# Patient Record
Sex: FEMALE | Race: WHITE | NOT HISPANIC OR LATINO | Employment: STUDENT | URBAN - METROPOLITAN AREA
[De-identification: names, ages, dates, MRNs, and addresses within clinical notes are randomized per-mention and may not be internally consistent; named-entity substitution may affect disease eponyms.]

---
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
## 2024-03-12 ENCOUNTER — OFFICE VISIT (OUTPATIENT)
Dept: OBGYN CLINIC | Age: 19
End: 2024-03-12
Payer: COMMERCIAL

## 2024-03-12 VITALS
WEIGHT: 134 LBS | SYSTOLIC BLOOD PRESSURE: 110 MMHG | BODY MASS INDEX: 22.88 KG/M2 | DIASTOLIC BLOOD PRESSURE: 68 MMHG | HEIGHT: 64 IN

## 2024-03-12 DIAGNOSIS — R10.2 PELVIC PAIN: Primary | ICD-10-CM

## 2024-03-12 DIAGNOSIS — N80.9 ENDOMETRIOSIS DETERMINED BY LAPAROSCOPY: ICD-10-CM

## 2024-03-12 DIAGNOSIS — N93.9 ABNORMAL UTERINE BLEEDING (AUB): ICD-10-CM

## 2024-03-12 DIAGNOSIS — Z30.431 SURVEILLANCE FOR BIRTH CONTROL, INTRAUTERINE DEVICE: ICD-10-CM

## 2024-03-12 PROCEDURE — 99204 OFFICE O/P NEW MOD 45 MIN: CPT | Performed by: OBSTETRICS & GYNECOLOGY

## 2024-03-12 PROCEDURE — G8420 CALC BMI NORM PARAMETERS: HCPCS | Performed by: OBSTETRICS & GYNECOLOGY

## 2024-03-12 PROCEDURE — G8427 DOCREV CUR MEDS BY ELIG CLIN: HCPCS | Performed by: OBSTETRICS & GYNECOLOGY

## 2024-03-12 PROCEDURE — G8484 FLU IMMUNIZE NO ADMIN: HCPCS | Performed by: OBSTETRICS & GYNECOLOGY

## 2024-03-12 PROCEDURE — 99459 PELVIC EXAMINATION: CPT | Performed by: OBSTETRICS & GYNECOLOGY

## 2024-03-12 PROCEDURE — 1036F TOBACCO NON-USER: CPT | Performed by: OBSTETRICS & GYNECOLOGY

## 2024-03-12 RX ORDER — BUPROPION HYDROCHLORIDE 150 MG/1
150 TABLET ORAL EVERY MORNING
COMMUNITY

## 2024-03-12 NOTE — PROGRESS NOTES
[facility]  [address], [place] [address]  [phone]    Karen Burton MD, FACOG  Driss Paige MD, FACOG  JAYCEE Santana-BC      Assessment/Plan     Anjana was seen today for new patient and pelvic pain.    Diagnoses and all orders for this visit:    Pelvic pain  Comments:  - known endometriosis  - denies any urinary or bowel symptoms  - will check US to look at adnexa/IUD placement  - IUD strings and stem not visible on exam today  - exam not consistent with pelvic infection (ni discharge, no CMT)    Endometriosis determined by laparoscopy  Comments:  - pain has been well managed with IUD thus far  - now having plan  - if US normal, consider UA, pelvic floor PT  - aslo discussed orlissa/myfembree and given information pamphlets    Surveillance for birth control, intrauterine device  Comments:  - IUD strings/device not seen on exam  - US 2023 with proper placement  - will check US next available    Abnormal uterine bleeding (AUB)  Comments:  - has had some spotting, typically monthly  - episode a few days ago and that is when she thought malposition/expulsion of IUD  - check US     F/u for pelvic US  Patient tolerates vaginal US and exams well  Will need pap/annual once she turns 19       Subjective     Anjana Mercado 18 y.o.  presents today for a gyn problem of known endometriosis and pelvic pain, possible malpositioned IUD. New patient, had to change practices due to insurance constraints. She has had pain with periods since age 6 (menarche). Was initially seen at [facility], tried several medications. Then had surgery at [facility] with Dr. Moran 23. She had an IUD placed at the time of surgery. Noted to have some endo in the posterior culdesac that was excised/ablated.     H/o irregular periods and was initially told she had PCOS but states US and labs do not correlate. She has tried continuous loestrin, also aygestin (2.5 mg and 5mg) but had SE of nausea. Never been

## 2024-03-12 NOTE — PROGRESS NOTES
Patient comes in today as a New Patient for pelvic pain. Patient states she went to check her cervix due to bleeding last night and she felt her IUD coming out but it went back in. Patient states her pain level is 7-8/10 as of today.     LAST PAP:  Never    LAST MAMMO:  Never    LMP:  Patient's last menstrual period was 03/08/2024 (exact date).    BIRTH CONTROL:  IUD    TOBACCO USE:  No    FAMILY HISTORY OF:   Breast Cancer:  No   Ovarian Cancer:  No   Uterine Cancer:  No   Colon Cancer:  No    Vitals:    03/12/24 0938   BP: 110/68   Site: Right Upper Arm   Position: Sitting   Weight: 60.8 kg (134 lb)   Height: 1.626 m (5' 4\")        Cheryl Whitmore MA  03/12/24  9:47 AM

## 2024-03-13 ENCOUNTER — TELEPHONE (OUTPATIENT)
Dept: OBGYN CLINIC | Age: 19
End: 2024-03-13

## 2024-03-13 NOTE — TELEPHONE ENCOUNTER
Dr. Burton,    Patient would like you to send in a prescription of Orilissa after she read over the pamphlets.

## 2024-03-14 ENCOUNTER — TELEPHONE (OUTPATIENT)
Dept: OBGYN CLINIC | Age: 19
End: 2024-03-14

## 2024-03-14 DIAGNOSIS — R10.2 PELVIC PAIN: ICD-10-CM

## 2024-03-14 DIAGNOSIS — N80.9 ENDOMETRIOSIS: Primary | ICD-10-CM

## 2024-03-14 NOTE — TELEPHONE ENCOUNTER
Pt sent [employer] message below.   Please advise       ----- Message from Anjana Mercado sent at 3/14/2024  9:31 AM EDT -----  Regarding: Trying a new medication  Contact: [phone]  Meliton Burton. During our last appointment you gave me different options for supplemental medications I could try in tandem with the IUD. I've decided on the Orilissa after some thinking. Do you mind sending in a prescription for it?  - Anjana

## 2024-03-15 NOTE — TELEPHONE ENCOUNTER
Dr. Burton,    Called patient, reviewed your note with patient:    \"Please let her know that I dont mind prescribing it, but I thought the plan was to check on IUD position first. Does she want to do the Orlissa regardless of if the IUD is malpositioned or not?\"    Patient confirms wanting to start Orilissa before IUD placement is checked.   DISPLAY PLAN FREE TEXT

## 2024-04-18 ENCOUNTER — PROCEDURE VISIT (OUTPATIENT)
Dept: OBGYN CLINIC | Age: 19
End: 2024-04-18
Payer: COMMERCIAL

## 2024-04-18 ENCOUNTER — OFFICE VISIT (OUTPATIENT)
Dept: OBGYN CLINIC | Age: 19
End: 2024-04-18
Payer: COMMERCIAL

## 2024-04-18 VITALS
DIASTOLIC BLOOD PRESSURE: 60 MMHG | WEIGHT: 134.2 LBS | SYSTOLIC BLOOD PRESSURE: 98 MMHG | BODY MASS INDEX: 22.91 KG/M2 | HEIGHT: 64 IN

## 2024-04-18 DIAGNOSIS — N80.9 ENDOMETRIOSIS: ICD-10-CM

## 2024-04-18 DIAGNOSIS — N93.9 ABNORMAL UTERINE BLEEDING (AUB): Primary | ICD-10-CM

## 2024-04-18 DIAGNOSIS — R10.2 PELVIC PAIN IN FEMALE: ICD-10-CM

## 2024-04-18 DIAGNOSIS — R50.9 TEMPERATURE ELEVATION: ICD-10-CM

## 2024-04-18 DIAGNOSIS — R10.2 PELVIC PAIN IN FEMALE: Primary | ICD-10-CM

## 2024-04-18 DIAGNOSIS — Z30.431 SURVEILLANCE FOR BIRTH CONTROL, INTRAUTERINE DEVICE: ICD-10-CM

## 2024-04-18 LAB
BASOPHILS # BLD: 0.1 K/UL (ref 0–0.2)
BASOPHILS NFR BLD: 1 % (ref 0–2)
DIFFERENTIAL METHOD BLD: ABNORMAL
EOSINOPHIL # BLD: 0.2 K/UL (ref 0–0.8)
EOSINOPHIL NFR BLD: 3 % (ref 0.5–7.8)
ERYTHROCYTE [DISTWIDTH] IN BLOOD BY AUTOMATED COUNT: 12 % (ref 11.9–14.6)
FERRITIN SERPL-MCNC: 26 NG/ML (ref 8–388)
HCT VFR BLD AUTO: 42.5 % (ref 35.8–46.3)
HGB BLD-MCNC: 14.5 G/DL (ref 11.7–15.4)
IMM GRANULOCYTES # BLD AUTO: 0 K/UL (ref 0–0.5)
IMM GRANULOCYTES NFR BLD AUTO: 0 % (ref 0–5)
LYMPHOCYTES # BLD: 2.7 K/UL (ref 0.5–4.6)
LYMPHOCYTES NFR BLD: 34 % (ref 13–44)
MCH RBC QN AUTO: 28.7 PG (ref 26.1–32.9)
MCHC RBC AUTO-ENTMCNC: 34.1 G/DL (ref 31.4–35)
MCV RBC AUTO: 84 FL (ref 82–102)
MONOCYTES # BLD: 0.6 K/UL (ref 0.1–1.3)
MONOCYTES NFR BLD: 8 % (ref 4–12)
NEUTS SEG # BLD: 4.3 K/UL (ref 1.7–8.2)
NEUTS SEG NFR BLD: 54 % (ref 43–78)
NRBC # BLD: 0 K/UL (ref 0–0.2)
PLATELET # BLD AUTO: 287 K/UL (ref 150–450)
PMV BLD AUTO: 9.1 FL (ref 9.4–12.3)
RBC # BLD AUTO: 5.06 M/UL (ref 4.05–5.2)
TSH W FREE THYROID IF ABNORMAL: 1.59 UIU/ML (ref 0.36–3.74)
WBC # BLD AUTO: 8 K/UL (ref 4.3–11.1)

## 2024-04-18 PROCEDURE — G8420 CALC BMI NORM PARAMETERS: HCPCS | Performed by: OBSTETRICS & GYNECOLOGY

## 2024-04-18 PROCEDURE — G8427 DOCREV CUR MEDS BY ELIG CLIN: HCPCS | Performed by: OBSTETRICS & GYNECOLOGY

## 2024-04-18 PROCEDURE — 99214 OFFICE O/P EST MOD 30 MIN: CPT | Performed by: OBSTETRICS & GYNECOLOGY

## 2024-04-18 PROCEDURE — 1036F TOBACCO NON-USER: CPT | Performed by: OBSTETRICS & GYNECOLOGY

## 2024-04-18 PROCEDURE — 76830 TRANSVAGINAL US NON-OB: CPT | Performed by: OBSTETRICS & GYNECOLOGY

## 2024-04-18 RX ORDER — CHOLECALCIFEROL (VITAMIN D3) 125 MCG
1 CAPSULE ORAL NIGHTLY
COMMUNITY
Start: 2024-04-13

## 2024-04-18 SDOH — ECONOMIC STABILITY: INCOME INSECURITY: HOW HARD IS IT FOR YOU TO PAY FOR THE VERY BASICS LIKE FOOD, HOUSING, MEDICAL CARE, AND HEATING?: NOT HARD AT ALL

## 2024-04-18 SDOH — ECONOMIC STABILITY: FOOD INSECURITY: WITHIN THE PAST 12 MONTHS, THE FOOD YOU BOUGHT JUST DIDN'T LAST AND YOU DIDN'T HAVE MONEY TO GET MORE.: NEVER TRUE

## 2024-04-18 SDOH — ECONOMIC STABILITY: FOOD INSECURITY: WITHIN THE PAST 12 MONTHS, YOU WORRIED THAT YOUR FOOD WOULD RUN OUT BEFORE YOU GOT MONEY TO BUY MORE.: NEVER TRUE

## 2024-04-18 SDOH — ECONOMIC STABILITY: HOUSING INSECURITY
IN THE LAST 12 MONTHS, WAS THERE A TIME WHEN YOU DID NOT HAVE A STEADY PLACE TO SLEEP OR SLEPT IN A SHELTER (INCLUDING NOW)?: NO

## 2024-04-18 NOTE — PROGRESS NOTES
Patient here for gyn f/u with US for IUD placement check and Orilissa med check.   Patient states the orilissa over all has had positive improvement with her pain.   Patient states the spotting is still the same.     LAST PAP:  never     LAST MAMMO:  never     LMP:  Patient's last menstrual period was 03/25/2024 (exact date).    BIRTH CONTROL:  IUD    TOBACCO USE:  No    FAMILY HISTORY OF:   Breast Cancer:  No   Ovarian Cancer:  No   Uterine Cancer:  No   Colon Cancer:  No    Vitals:    04/18/24 1059   BP: 98/60   Site: Right Upper Arm   Position: Sitting   Weight: 60.9 kg (134 lb 3.2 oz)   Height: 1.626 m (5' 4\")        SAPNA FLETCHER RN  04/18/24  11:05 AM     
physical abuse, no history of sexual abuse      Social Determinants of Health     Financial Resource Strain: Low Risk  (4/18/2024)    Overall Financial Resource Strain (CARDIA)     Difficulty of Paying Living Expenses: Not hard at all   Food Insecurity: No Food Insecurity (4/18/2024)    Hunger Vital Sign     Worried About Running Out of Food in the Last Year: Never true     Ran Out of Food in the Last Year: Never true   Transportation Needs: Unknown (4/18/2024)    PRAPARE - Transportation     Lack of Transportation (Medical): Not on file     Lack of Transportation (Non-Medical): No   Physical Activity: Not on file   Stress: Not on file   Social Connections: Not on file   Intimate Partner Violence: Not on file   Housing Stability: Unknown (4/18/2024)    Housing Stability Vital Sign     Unable to Pay for Housing in the Last Year: Not on file     Number of Places Lived in the Last Year: Not on file     Unstable Housing in the Last Year: No         Allergies   Allergen Reactions    Ciprofloxacin Other (See Comments)     \"fever dreams\" per pt          Review of Systems    Constitutional:  No fevers or chills    Neuro:  No headaches, seizure activity    HEENT: no visual changes, bleeding gums    CV: No chest pain or palpitations    Resp: No SOB or cough    Breast:  No masses, pain, D/C, bleeding    GI:  No nausea/vomiting/diarrhea/constipation    : No dysuria or hematuria    MS:  No back, joint pain    Gyn:  As per HPI    Psych:  No depression, anxiety      Objective       Vitals:    04/18/24 1059   BP: 98/60         Physical Exam **    General:  well developed, well nourished, in no acute distress     Head:   normocephalic and atraumatic     Mouth:  no deformity or lesions with good dentition     Msk:   no deformity or scoliosis noted with normal posture and gait     Extremities: no clubbing, cyanosis, edema, or deformity noted with normal full range of motion of all joints     Neurologic:  no focal deficits,normal

## 2024-04-30 ENCOUNTER — OFFICE VISIT (OUTPATIENT)
Dept: FAMILY MEDICINE CLINIC | Facility: CLINIC | Age: 19
End: 2024-04-30
Payer: COMMERCIAL

## 2024-04-30 VITALS
HEART RATE: 81 BPM | SYSTOLIC BLOOD PRESSURE: 120 MMHG | TEMPERATURE: 97.6 F | HEIGHT: 64 IN | DIASTOLIC BLOOD PRESSURE: 85 MMHG | OXYGEN SATURATION: 97 % | BODY MASS INDEX: 22.53 KG/M2 | WEIGHT: 132 LBS | RESPIRATION RATE: 16 BRPM

## 2024-04-30 DIAGNOSIS — N80.9 ENDOMETRIOSIS: ICD-10-CM

## 2024-04-30 DIAGNOSIS — R11.0 NAUSEA: ICD-10-CM

## 2024-04-30 DIAGNOSIS — R10.11 RUQ PAIN: Primary | ICD-10-CM

## 2024-04-30 DIAGNOSIS — N94.6 DYSMENORRHEA: ICD-10-CM

## 2024-04-30 DIAGNOSIS — R19.7 DIARRHEA, UNSPECIFIED TYPE: ICD-10-CM

## 2024-04-30 DIAGNOSIS — Z91.018 MULTIPLE FOOD ALLERGIES: ICD-10-CM

## 2024-04-30 DIAGNOSIS — R10.31 RLQ ABDOMINAL PAIN: ICD-10-CM

## 2024-04-30 DIAGNOSIS — K20.0 EOSINOPHILIC ESOPHAGITIS: ICD-10-CM

## 2024-04-30 LAB
ALBUMIN SERPL-MCNC: 4.9 G/DL (ref 3.5–5)
ALBUMIN/GLOB SERPL: 2.2 (ref 1–1.9)
ALP SERPL-CCNC: 70 U/L (ref 35–104)
ALT SERPL-CCNC: 10 U/L (ref 12–65)
ANION GAP SERPL CALC-SCNC: 12 MMOL/L (ref 9–18)
AST SERPL-CCNC: 17 U/L (ref 15–37)
BILIRUB DIRECT SERPL-MCNC: 0.3 MG/DL (ref 0–0.4)
BILIRUB SERPL-MCNC: 1.5 MG/DL (ref 0–1.2)
BUN SERPL-MCNC: 7 MG/DL (ref 6–23)
CALCIUM SERPL-MCNC: 10.4 MG/DL (ref 8.8–10.2)
CHLORIDE SERPL-SCNC: 103 MMOL/L (ref 98–107)
CO2 SERPL-SCNC: 26 MMOL/L (ref 20–28)
CREAT SERPL-MCNC: 0.82 MG/DL (ref 0.6–1.1)
ERYTHROCYTE [SEDIMENTATION RATE] IN BLOOD: <1 MM/HR (ref 0–20)
GLOBULIN SER CALC-MCNC: 2.2 G/DL (ref 2.3–3.5)
GLUCOSE SERPL-MCNC: 98 MG/DL (ref 70–99)
POTASSIUM SERPL-SCNC: 4.3 MMOL/L (ref 3.5–5.1)
PROT SERPL-MCNC: 7.1 G/DL (ref 6.3–8.2)
SODIUM SERPL-SCNC: 141 MMOL/L (ref 136–145)

## 2024-04-30 PROCEDURE — G8427 DOCREV CUR MEDS BY ELIG CLIN: HCPCS | Performed by: FAMILY MEDICINE

## 2024-04-30 PROCEDURE — 1036F TOBACCO NON-USER: CPT | Performed by: FAMILY MEDICINE

## 2024-04-30 PROCEDURE — G8420 CALC BMI NORM PARAMETERS: HCPCS | Performed by: FAMILY MEDICINE

## 2024-04-30 PROCEDURE — 99204 OFFICE O/P NEW MOD 45 MIN: CPT | Performed by: FAMILY MEDICINE

## 2024-04-30 RX ORDER — COLESEVELAM 180 1/1
TABLET ORAL
Qty: 60 TABLET | Refills: 1 | Status: SHIPPED | OUTPATIENT
Start: 2024-04-30

## 2024-04-30 RX ORDER — COLESEVELAM 180 1/1
TABLET ORAL
Qty: 180 TABLET | OUTPATIENT
Start: 2024-04-30

## 2024-04-30 SDOH — HEALTH STABILITY: PHYSICAL HEALTH: ON AVERAGE, HOW MANY MINUTES DO YOU ENGAGE IN EXERCISE AT THIS LEVEL?: 100 MIN

## 2024-04-30 SDOH — HEALTH STABILITY: PHYSICAL HEALTH: ON AVERAGE, HOW MANY DAYS PER WEEK DO YOU ENGAGE IN MODERATE TO STRENUOUS EXERCISE (LIKE A BRISK WALK)?: 6 DAYS

## 2024-04-30 ASSESSMENT — PATIENT HEALTH QUESTIONNAIRE - PHQ9
4. FEELING TIRED OR HAVING LITTLE ENERGY: MORE THAN HALF THE DAYS
7. TROUBLE CONCENTRATING ON THINGS, SUCH AS READING THE NEWSPAPER OR WATCHING TELEVISION: MORE THAN HALF THE DAYS
9. THOUGHTS THAT YOU WOULD BE BETTER OFF DEAD, OR OF HURTING YOURSELF: NOT AT ALL
1. LITTLE INTEREST OR PLEASURE IN DOING THINGS: NOT AT ALL
8. MOVING OR SPEAKING SO SLOWLY THAT OTHER PEOPLE COULD HAVE NOTICED. OR THE OPPOSITE, BEING SO FIGETY OR RESTLESS THAT YOU HAVE BEEN MOVING AROUND A LOT MORE THAN USUAL: SEVERAL DAYS
10. IF YOU CHECKED OFF ANY PROBLEMS, HOW DIFFICULT HAVE THESE PROBLEMS MADE IT FOR YOU TO DO YOUR WORK, TAKE CARE OF THINGS AT HOME, OR GET ALONG WITH OTHER PEOPLE: SOMEWHAT DIFFICULT
6. FEELING BAD ABOUT YOURSELF - OR THAT YOU ARE A FAILURE OR HAVE LET YOURSELF OR YOUR FAMILY DOWN: NOT AT ALL
3. TROUBLE FALLING OR STAYING ASLEEP: SEVERAL DAYS
5. POOR APPETITE OR OVEREATING: SEVERAL DAYS
SUM OF ALL RESPONSES TO PHQ9 QUESTIONS 1 & 2: 1
2. FEELING DOWN, DEPRESSED OR HOPELESS: SEVERAL DAYS
SUM OF ALL RESPONSES TO PHQ QUESTIONS 1-9: 8

## 2024-04-30 NOTE — PROGRESS NOTES
biliary pathology or any other abnormalities palpated on exam today.  This may end up being endometriosis related but it could be IBS.  I think a safe thing to do at this point is recommend prudent diet, high-fiber but begin WelChol 2 tablets a day until we get more information back.  If proven to be IBS simply Bentyl would be an okay choice but the pattern is only of 3 weeks  Followup:   No follow-ups on file.

## 2024-05-01 ENCOUNTER — TELEPHONE (OUTPATIENT)
Dept: FAMILY MEDICINE CLINIC | Facility: CLINIC | Age: 19
End: 2024-05-01

## 2024-05-01 PROBLEM — R17 ELEVATED BILIRUBIN: Status: ACTIVE | Noted: 2024-05-01

## 2024-05-01 NOTE — TELEPHONE ENCOUNTER
Pt seen yesterday for ABD pain and stated she has now started vomiting and becoming very weak and faint. Referred for an US and was scheduled for the 11th. I called Radiology to see if they can get her in sooner and they re-scheduled her for 5/3/24. I gave pt new appt time and location and gave her ED precautions. She voiced understanding.

## 2024-05-03 ENCOUNTER — HOSPITAL ENCOUNTER (OUTPATIENT)
Dept: ULTRASOUND IMAGING | Age: 19
Discharge: HOME | End: 2024-05-03
Attending: FAMILY MEDICINE
Payer: COMMERCIAL

## 2024-05-03 ENCOUNTER — TELEPHONE (OUTPATIENT)
Dept: FAMILY MEDICINE CLINIC | Facility: CLINIC | Age: 19
End: 2024-05-03

## 2024-05-03 DIAGNOSIS — R11.0 NAUSEA: ICD-10-CM

## 2024-05-03 DIAGNOSIS — R10.11 RUQ PAIN: ICD-10-CM

## 2024-05-03 DIAGNOSIS — R10.31 RLQ ABDOMINAL PAIN: ICD-10-CM

## 2024-05-03 DIAGNOSIS — N80.9 ENDOMETRIOSIS: ICD-10-CM

## 2024-05-03 DIAGNOSIS — R19.7 DIARRHEA, UNSPECIFIED TYPE: ICD-10-CM

## 2024-05-03 PROCEDURE — 76700 US EXAM ABDOM COMPLETE: CPT

## 2024-05-03 NOTE — TELEPHONE ENCOUNTER
Pt called to obtain results from her US on 5/3/24. She was concerned about a comment on her resulted that stated: \"Nonspecific hepatomegaly\"    I ensured the pt of Dr. Snyder's positive comments and recommendations. US result was indeed normal. Pt voiced understanding.

## 2024-05-07 ENCOUNTER — NURSE ONLY (OUTPATIENT)
Dept: FAMILY MEDICINE CLINIC | Facility: CLINIC | Age: 19
End: 2024-05-07

## 2024-05-07 DIAGNOSIS — D80.1 LOW GAMMAGLOBULIN LEVEL (HCC): ICD-10-CM

## 2024-05-07 DIAGNOSIS — R17 ELEVATED BILIRUBIN: Primary | ICD-10-CM

## 2024-05-07 LAB
ALBUMIN SERPL-MCNC: 5.1 G/DL (ref 3.5–5)
ALBUMIN/GLOB SERPL: 2.6 (ref 1–1.9)
ALP SERPL-CCNC: 64 U/L (ref 35–104)
ALT SERPL-CCNC: 11 U/L (ref 12–65)
AST SERPL-CCNC: 18 U/L (ref 15–37)
BILIRUB DIRECT SERPL-MCNC: 0.2 MG/DL (ref 0–0.4)
BILIRUB SERPL-MCNC: 1.1 MG/DL (ref 0–1.2)
GLOBULIN SER CALC-MCNC: 2 G/DL (ref 2.3–3.5)
PROT SERPL-MCNC: 7.1 G/DL (ref 6.3–8.2)

## 2024-05-08 DIAGNOSIS — R77.1 HYPOGLOBULINEMIA: Primary | ICD-10-CM

## 2024-06-11 ENCOUNTER — HOSPITAL ENCOUNTER (OUTPATIENT)
Dept: NUTRITION | Age: 19
Setting detail: RECURRING SERIES
Discharge: HOME | End: 2024-06-14

## 2024-06-11 PROCEDURE — 97802 MEDICAL NUTRITION INDIV IN: CPT

## 2024-06-11 NOTE — PROGRESS NOTES
Marta Severino, MS, RD, LD  Outpatient Registered Dietitian  [facility]  Phone: [phone]  Fax: [phone]  ASSESSMENT  Anjana Mercado is a 19 y.o. female referred with the following diagnosis (es): Allergy to other foods [Z91.018]   PMH includes endometriosis, EOE  Labs: 5/7/2024: Albumin 5.1 (H) globulin 2.0 (L) ALT 11 (L)   Meds: Welchol, wellbutrin     Patient stated goal: have a less restrictive diet and avoid developing pre-diabetes     Patient has been following a fairly restrictive diet due to previous allergy test results in 2021 of multiple food allergies. Had another food allergy test on 5/29/24 positive for carrots and shrimp only. She has also made an effort to lose weight and has lost 13# since May. She is concerned with developing prediabetes d/t family history and is following a low fat diet d/t liver ultrasound. She has cut out all processed food and avoids any foods high in fat or sugar, not a lot of dairy. Concerned with parasites in foods, fat and sugar content. Reports having more energy since changing diet & losing weight and is more productive at work.   She eats her meals with her dad who has kidney disease, so finds it difficult to have variety with his dietary needs as well (less protein, more vegetarian diet).   -does not endorse any GI symptoms at this time     Weight history:  Weight in office 6/11: 119/8#     Foods she would like to reintroduce: occasional red meat, pizza     Factors impacting food choices:   -Established food preferences/eating behaviors  -Confusion on nutrition advice  -Family food preferences  -Diet culture: fear of foods, preoccupation with food choices    Initial Diet Recall (kept 3-day food log):   B: smoothie with greek yogurt, blackberries, blueberries, strawberries or 2 eggs with sriracha   L: spinach salad with broccoli, bell peppers, cucumbers, olive garden light italian dressing   D: rotisserie chicken with sauteed

## 2024-06-21 DIAGNOSIS — R19.7 DIARRHEA, UNSPECIFIED TYPE: ICD-10-CM

## 2024-06-21 RX ORDER — COLESEVELAM 180 1/1
TABLET ORAL
Qty: 60 TABLET | Refills: 1 | OUTPATIENT
Start: 2024-06-21

## 2024-06-24 DIAGNOSIS — R19.7 DIARRHEA, UNSPECIFIED TYPE: ICD-10-CM

## 2024-06-24 DIAGNOSIS — N80.9 ENDOMETRIOSIS: Primary | ICD-10-CM

## 2024-06-24 DIAGNOSIS — R10.2 PELVIC PAIN: ICD-10-CM

## 2024-06-24 RX ORDER — COLESEVELAM 180 1/1
TABLET ORAL
Qty: 60 TABLET | Refills: 1 | Status: CANCELLED | OUTPATIENT
Start: 2024-06-24

## 2024-06-25 DIAGNOSIS — R19.7 DIARRHEA, UNSPECIFIED TYPE: ICD-10-CM

## 2024-06-25 RX ORDER — COLESEVELAM 180 1/1
TABLET ORAL
Qty: 60 TABLET | Refills: 1 | Status: SHIPPED | OUTPATIENT
Start: 2024-06-25

## 2024-06-25 NOTE — TELEPHONE ENCOUNTER
Patient called for a Medication Refill Request    Name of Medication : colesevelam (WELCHOL)     Strength of Medication: 625 MG tablet     Preferred Pharmacy: [id number] [place] [address] - P [phone]     Last Appt. Date: 4/30/2024    Next Appt. Date: none    Additional Information For Provider:

## 2024-06-27 ENCOUNTER — HOSPITAL ENCOUNTER (OUTPATIENT)
Dept: PHYSICAL THERAPY | Age: 19
Setting detail: RECURRING SERIES
Discharge: HOME | End: 2024-06-30
Payer: COMMERCIAL

## 2024-06-27 DIAGNOSIS — R27.8 OTHER LACK OF COORDINATION: ICD-10-CM

## 2024-06-27 DIAGNOSIS — M62.838 OTHER MUSCLE SPASM: Primary | ICD-10-CM

## 2024-06-27 PROCEDURE — 97162 PT EVAL MOD COMPLEX 30 MIN: CPT

## 2024-06-27 PROCEDURE — 97530 THERAPEUTIC ACTIVITIES: CPT

## 2024-06-27 PROCEDURE — 97140 MANUAL THERAPY 1/> REGIONS: CPT

## 2024-06-27 ASSESSMENT — PAIN SCALES - GENERAL: PAINLEVEL_OUTOF10: 5

## 2024-06-27 NOTE — THERAPY EVALUATION
X/100  Bowel or Rectum: X/100  Vagina or Pelvis: X/100   Interpretation of Score: Each of the 7 sections is scored on a scale from 0-3; 0 representing \"Not at all\", 3 representing \"Quite a bit\". The mean value is taken from all the answered items, then multiplied by 100 to obtain the scale score, ranging from 0-100.  This process is repeated for each column representing bowel, bladder, and pelvic pain.    Medical Necessity:   > Patient is expected to demonstrate progress in strength, range of motion, coordination, and functional technique to decrease pain and improve bladder and bowel function.  Reason For Services/Other Comments:  > Patient continues to require skilled intervention due to the above mentioned deficits.    Regarding Anjana Mercado's therapy, I certify that the treatment plan above will be carried out by a therapist or under their direction.  Thank you for this referral,  Tammy Verma PT     Referring Physician Signature: Karen Burton MD No Signature is Required for this note.        Charge Capture  Appt Desk  Attendance Report  Events        Future Appointments   Date Time Provider Department Center   7/2/2024 11:40 AM PERIPHERAL [facility]   7/2/2024 12:45 PM Kenny Lr MD [facility]   7/2/2024  4:00 PM Tammy Verma, PT [facility]   7/9/2024  2:00 PM Tammy Verma, PT [facility]   7/11/2024  4:15 PM Osmany Galdamez MD [facility]   7/18/2024  2:00 PM Tammy Verma, PT [facility]   7/25/2024  2:00 PM Tammy Verma, PT [facility]   7/30/2024  1:00 PM Tammy Verma, PT [facility]   10/22/2024 10:45 AM Karen Burton MD [facility]

## 2024-06-27 NOTE — PROGRESS NOTES
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  Reports having a 10-day hx of feeling fatigued, weak, diarrhea and nausea.    At that time, she was overwhelmed with right-sided upper and lower quadrant abdominal pain which lasted for about 15 minutes until her father gave her a pain medicine.  Since that time she has had nausea and diarrhea daily.  Denies BRB or black stools just simply loose watery diarrhea.    She has had nausea and vomiting as well during these periods although not as frequently as a loose stool. Denies flank pain or urinary symptoms.    Denies fever, chills, night sweats or cough.  Her diet has not changed.  She did switch brands of a probiotic about 3 weeks ago.     She has been seeing the obstetrician for pathology proven endometriosis.  She has multiple food allergies and would like referred to an allergist.  She has questions about nutrition particular with her dietary restrictions and would like to be referred to a nutritionist.     She has a history of eosinophilic esophagitis and does not want to return to [facility]. She would like to be referred to a different gastroenterologist.    Assessment/Plan:  Obtain an ultrasound to rule out biliary pathology or any other abnormalities palpated on exam today.  This may end up being endometriosis related but it could be IBS.    Recommended a prudent diet, high-fiber but begin WelChol 2 tablets a day until we get more information back.  If proven to be IBS, simply Bentyl would be an okay choice but the pattern is only of 3 weeks  Referral placed

## 2024-06-27 NOTE — PROGRESS NOTES
Anjana Mercado  : 2005  Primary: [facility] ([facility])  Secondary:  [facility][note 3[facility] @ [address]  [facility] [address]  Phone: [phone]  Fax: [phone] Plan Frequency: 1x/week for 90 days  Plan of Care/Certification Expiration Date: 24        Plan of Care/Certification Expiration Date:  Plan of Care/Certification Expiration Date: 24    Frequency/Duration: Plan Frequency: 1x/week for 90 days      Time In/Out:   Time In: 1256  Time Out: 1357      PT Visit Info:    Total # of Visits Approved: 60 (hard max, combined)  Progress Note Due Date: 24  Total # of Visits to Date: 1      Visit Count:  1    OUTPATIENT PHYSICAL THERAPY:   Treatment Note 2024       Episode  (PFPT)               Treatment Diagnosis:    Other muscle spasm  Other lack of coordination  Contributing Diagnosis:  Constipation, unspecified (K59.00), Endometriosis, unspecified (N80.9), Hesitancy of micturition (R39.11), Pelvic and perineal pain (R10.2), and Pelvic floor dysfunction in female (M62.98)  Medical/Referring Diagnosis:    Pelvic and perineal pain [R10.2]  Endometriosis, unspecified [N80.9]      Referring Physician:  Karen Burton MD MD Orders:  PT Eval and Treat   Return MD Appt: 10/22/24   Date of Onset:  Onset Date: 11 (approximate date - ~6 years old)     Allergies:   Ciprofloxacin  Restrictions/Precautions:   None      Interventions Planned (Treatment may consist of any combination of the following):     See Assessment Note    Subjective Comments:   Endometriosis, ex lap in , pelvic (lower ab, perineum, low back) pain worse with prolonged positioning or heavy activity, better with stretches and biofreeze patches, never been sexually active, pelvic exams painful, currently on orilissa (no period right now)    Initial Pain Level::     5/10  Post Session Pain Level:       6/10  Medications Last Reviewed:  2024  Updated Objective

## 2024-06-28 DIAGNOSIS — R16.0 HEPATOMEGALY: ICD-10-CM

## 2024-06-28 DIAGNOSIS — R17 ELEVATED BILIRUBIN: ICD-10-CM

## 2024-06-28 DIAGNOSIS — R10.84 GENERALIZED ABDOMINAL PAIN: ICD-10-CM

## 2024-06-28 DIAGNOSIS — D50.8 OTHER IRON DEFICIENCY ANEMIA: ICD-10-CM

## 2024-06-28 DIAGNOSIS — N92.1 MENOMETRORRHAGIA: ICD-10-CM

## 2024-06-28 DIAGNOSIS — N94.6 DYSMENORRHEA: Primary | ICD-10-CM

## 2024-06-28 RX ORDER — COLESEVELAM 180 1/1
TABLET ORAL
Qty: 180 TABLET | OUTPATIENT
Start: 2024-06-28

## 2024-07-02 ENCOUNTER — OFFICE VISIT (OUTPATIENT)
Dept: ONCOLOGY | Age: 19
End: 2024-07-02
Payer: COMMERCIAL

## 2024-07-02 ENCOUNTER — HOSPITAL ENCOUNTER (OUTPATIENT)
Dept: LAB | Age: 19
Discharge: HOME | End: 2024-07-05
Payer: COMMERCIAL

## 2024-07-02 ENCOUNTER — HOSPITAL ENCOUNTER (OUTPATIENT)
Dept: PHYSICAL THERAPY | Age: 19
Setting detail: RECURRING SERIES
Discharge: HOME | End: 2024-07-05
Payer: COMMERCIAL

## 2024-07-02 VITALS
DIASTOLIC BLOOD PRESSURE: 81 MMHG | RESPIRATION RATE: 18 BRPM | TEMPERATURE: 97.8 F | SYSTOLIC BLOOD PRESSURE: 105 MMHG | HEIGHT: 64 IN | WEIGHT: 116.6 LBS | OXYGEN SATURATION: 98 % | HEART RATE: 90 BPM | BODY MASS INDEX: 19.91 KG/M2

## 2024-07-02 DIAGNOSIS — N94.6 DYSMENORRHEA: ICD-10-CM

## 2024-07-02 DIAGNOSIS — R17 ELEVATED BILIRUBIN: ICD-10-CM

## 2024-07-02 DIAGNOSIS — R77.1 ABNORMALITY OF GLOBULIN: ICD-10-CM

## 2024-07-02 DIAGNOSIS — R16.0 HEPATOMEGALY: ICD-10-CM

## 2024-07-02 DIAGNOSIS — N92.1 MENOMETRORRHAGIA: ICD-10-CM

## 2024-07-02 DIAGNOSIS — R10.84 GENERALIZED ABDOMINAL PAIN: ICD-10-CM

## 2024-07-02 DIAGNOSIS — D50.8 OTHER IRON DEFICIENCY ANEMIA: ICD-10-CM

## 2024-07-02 DIAGNOSIS — E53.8 LOW SERUM VITAMIN B12: Primary | ICD-10-CM

## 2024-07-02 LAB
ALBUMIN SERPL-MCNC: 5.2 G/DL (ref 3.5–5)
ALBUMIN/GLOB SERPL: 2.3 (ref 1–1.9)
ALP SERPL-CCNC: 74 U/L (ref 35–104)
ALT SERPL-CCNC: 14 U/L (ref 12–65)
ANION GAP SERPL CALC-SCNC: 10 MMOL/L (ref 9–18)
APPEARANCE UR: CLEAR
AST SERPL-CCNC: 20 U/L (ref 15–37)
BACTERIA URNS QL MICRO: 0 /HPF
BASOPHILS # BLD: 0.1 K/UL (ref 0–0.2)
BASOPHILS NFR BLD: 1 % (ref 0–2)
BILIRUB SERPL-MCNC: 2.1 MG/DL (ref 0–1.2)
BILIRUB UR QL: NEGATIVE
BUN SERPL-MCNC: 9 MG/DL (ref 6–23)
CALCIUM SERPL-MCNC: 10 MG/DL (ref 8.8–10.2)
CHLORIDE SERPL-SCNC: 105 MMOL/L (ref 98–107)
CO2 SERPL-SCNC: 27 MMOL/L (ref 20–28)
COLOR UR: YELLOW
CREAT SERPL-MCNC: 0.74 MG/DL (ref 0.6–1.1)
CRP SERPL HS-MCNC: 0.3 MG/L (ref 0–3)
DAT POLY-SP REAG RBC QL: NORMAL
DIFFERENTIAL METHOD BLD: NORMAL
EOSINOPHIL # BLD: 0.3 K/UL (ref 0–0.8)
EOSINOPHIL NFR BLD: 4 % (ref 0.5–7.8)
EPI CELLS #/AREA URNS HPF: NORMAL /HPF
ERYTHROCYTE [DISTWIDTH] IN BLOOD BY AUTOMATED COUNT: 12.7 % (ref 11.9–14.6)
ERYTHROCYTE [SEDIMENTATION RATE] IN BLOOD: <1 MM/HR (ref 0–20)
FERRITIN SERPL-MCNC: 52 NG/ML (ref 8–388)
FOLATE SERPL-MCNC: 13.8 NG/ML (ref 3.1–17.5)
GLOBULIN SER CALC-MCNC: 2.3 G/DL (ref 2.3–3.5)
GLUCOSE SERPL-MCNC: 81 MG/DL (ref 70–99)
GLUCOSE UR STRIP.AUTO-MCNC: NEGATIVE MG/DL
HAV IGM SER QL: NONREACTIVE
HBV CORE IGM SER QL: NONREACTIVE
HBV SURFACE AG SER QL: NONREACTIVE
HCT VFR BLD AUTO: 39.2 % (ref 35.8–46.3)
HCV AB SER QL: NONREACTIVE
HGB BLD-MCNC: 13.1 G/DL (ref 11.7–15.4)
HGB RETIC QN AUTO: 33 PG (ref 29–35)
HGB UR QL STRIP: NEGATIVE
HIV 1+2 AB+HIV1 P24 AG SERPL QL IA: NONREACTIVE
HIV 1/2 RESULT COMMENT: NORMAL
IMM GRANULOCYTES # BLD AUTO: 0 K/UL (ref 0–0.5)
IMM GRANULOCYTES NFR BLD AUTO: 0 % (ref 0–5)
IMM RETICS NFR: 5.8 % (ref 3–15.9)
IRON SATN MFR SERPL: 44 % (ref 20–50)
IRON SERPL-MCNC: 142 UG/DL (ref 35–100)
KAPPA LC FREE SER-MCNC: 11.6 MG/L (ref 2.4–20.7)
KAPPA LC FREE/LAMBDA FREE SER: 2 (ref 0.2–0.8)
KETONES UR QL STRIP.AUTO: NEGATIVE MG/DL
LAMBDA LC FREE SERPL-MCNC: 5.8 MG/L (ref 4.2–27.7)
LDH SERPL L TO P-CCNC: 146 U/L (ref 127–281)
LEUKOCYTE ESTERASE UR QL STRIP.AUTO: NORMAL
LYMPHOCYTES # BLD: 2 K/UL (ref 0.5–4.6)
LYMPHOCYTES NFR BLD: 28 % (ref 13–44)
MCH RBC QN AUTO: 28.4 PG (ref 26.1–32.9)
MCHC RBC AUTO-ENTMCNC: 33.4 G/DL (ref 31.4–35)
MCV RBC AUTO: 85 FL (ref 82–102)
MONOCYTES # BLD: 0.7 K/UL (ref 0.1–1.3)
MONOCYTES NFR BLD: 10 % (ref 4–12)
MUCOUS THREADS URNS QL MICRO: 0 /LPF
NEUTS SEG # BLD: 4.1 K/UL (ref 1.7–8.2)
NEUTS SEG NFR BLD: 57 % (ref 43–78)
NITRITE UR QL STRIP.AUTO: NEGATIVE
NRBC # BLD: 0 K/UL (ref 0–0.2)
PH UR STRIP: 7.5 (ref 5–9)
PHOSPHATE SERPL-MCNC: 3.4 MG/DL (ref 2.5–4.5)
PLATELET # BLD AUTO: 243 K/UL (ref 150–450)
PMV BLD AUTO: 9.9 FL (ref 9.4–12.3)
POTASSIUM SERPL-SCNC: 4.1 MMOL/L (ref 3.5–5.1)
PROT SERPL-MCNC: 7.5 G/DL (ref 6.3–8.2)
PROT UR STRIP-MCNC: NEGATIVE MG/DL
RBC # BLD AUTO: 4.61 M/UL (ref 4.05–5.2)
RBC #/AREA URNS HPF: 0 /HPF
RETICS # AUTO: 0.07 M/UL (ref 0.03–0.1)
RETICS/RBC NFR AUTO: 1.5 % (ref 0.3–2)
SODIUM SERPL-SCNC: 142 MMOL/L (ref 136–145)
SP GR UR REFRACTOMETRY: 1.01 (ref 1–1.02)
T4 FREE SERPL-MCNC: 1.5 NG/DL (ref 0.9–1.7)
TIBC SERPL-MCNC: 323 UG/DL (ref 240–450)
UIBC SERPL-MCNC: 181 UG/DL (ref 112–347)
URATE SERPL-MCNC: 3.2 MG/DL (ref 2.5–7.1)
UROBILINOGEN UR QL STRIP.AUTO: 0.2 EU/DL
VIT B12 SERPL-MCNC: 260 PG/ML (ref 193–986)
WBC # BLD AUTO: 7.2 K/UL (ref 4.3–11.1)
WBC URNS QL MICRO: NORMAL /HPF

## 2024-07-02 PROCEDURE — 84550 ASSAY OF BLOOD/URIC ACID: CPT

## 2024-07-02 PROCEDURE — 86880 COOMBS TEST DIRECT: CPT

## 2024-07-02 PROCEDURE — 82746 ASSAY OF FOLIC ACID SERUM: CPT

## 2024-07-02 PROCEDURE — 84165 PROTEIN E-PHORESIS SERUM: CPT

## 2024-07-02 PROCEDURE — 36415 COLL VENOUS BLD VENIPUNCTURE: CPT

## 2024-07-02 PROCEDURE — 97140 MANUAL THERAPY 1/> REGIONS: CPT

## 2024-07-02 PROCEDURE — 86141 C-REACTIVE PROTEIN HS: CPT

## 2024-07-02 PROCEDURE — 85652 RBC SED RATE AUTOMATED: CPT

## 2024-07-02 PROCEDURE — 84439 ASSAY OF FREE THYROXINE: CPT

## 2024-07-02 PROCEDURE — 83020 HEMOGLOBIN ELECTROPHORESIS: CPT

## 2024-07-02 PROCEDURE — 86334 IMMUNOFIX E-PHORESIS SERUM: CPT

## 2024-07-02 PROCEDURE — 86038 ANTINUCLEAR ANTIBODIES: CPT

## 2024-07-02 PROCEDURE — 81001 URINALYSIS AUTO W/SCOPE: CPT

## 2024-07-02 PROCEDURE — 86225 DNA ANTIBODY NATIVE: CPT

## 2024-07-02 PROCEDURE — 83540 ASSAY OF IRON: CPT

## 2024-07-02 PROCEDURE — 84238 ASSAY NONENDOCRINE RECEPTOR: CPT

## 2024-07-02 PROCEDURE — 1036F TOBACCO NON-USER: CPT | Performed by: PEDIATRICS

## 2024-07-02 PROCEDURE — 87389 HIV-1 AG W/HIV-1&-2 AB AG IA: CPT

## 2024-07-02 PROCEDURE — 97530 THERAPEUTIC ACTIVITIES: CPT

## 2024-07-02 PROCEDURE — 83550 IRON BINDING TEST: CPT

## 2024-07-02 PROCEDURE — 82607 VITAMIN B-12: CPT

## 2024-07-02 PROCEDURE — 83615 LACTATE (LD) (LDH) ENZYME: CPT

## 2024-07-02 PROCEDURE — 85025 COMPLETE CBC W/AUTO DIFF WBC: CPT

## 2024-07-02 PROCEDURE — 82784 ASSAY IGA/IGD/IGG/IGM EACH: CPT

## 2024-07-02 PROCEDURE — 81404 MOPATH PROCEDURE LEVEL 5: CPT

## 2024-07-02 PROCEDURE — 84100 ASSAY OF PHOSPHORUS: CPT

## 2024-07-02 PROCEDURE — 97110 THERAPEUTIC EXERCISES: CPT

## 2024-07-02 PROCEDURE — 82728 ASSAY OF FERRITIN: CPT

## 2024-07-02 PROCEDURE — 85046 RETICYTE/HGB CONCENTRATE: CPT

## 2024-07-02 PROCEDURE — 99205 OFFICE O/P NEW HI 60 MIN: CPT | Performed by: PEDIATRICS

## 2024-07-02 PROCEDURE — G8420 CALC BMI NORM PARAMETERS: HCPCS | Performed by: PEDIATRICS

## 2024-07-02 PROCEDURE — 80053 COMPREHEN METABOLIC PANEL: CPT

## 2024-07-02 PROCEDURE — 83521 IG LIGHT CHAINS FREE EACH: CPT

## 2024-07-02 PROCEDURE — 80074 ACUTE HEPATITIS PANEL: CPT

## 2024-07-02 PROCEDURE — G8427 DOCREV CUR MEDS BY ELIG CLIN: HCPCS | Performed by: PEDIATRICS

## 2024-07-02 RX ORDER — UREA 10 %
1000 LOTION (ML) TOPICAL DAILY
Qty: 60 TABLET | Refills: 6 | Status: SHIPPED | OUTPATIENT
Start: 2024-07-02 | End: 2025-07-02

## 2024-07-02 ASSESSMENT — PAIN SCALES - GENERAL: PAINLEVEL_OUTOF10: 2

## 2024-07-02 NOTE — PATIENT INSTRUCTIONS
Patient Information from Today's Visit    Diagnosis: new patient visit     Upcoming GI Consult     Follow Up Instructions:   Dr Lr reviewed with you - your labs, history, symptoms, plan etc today     additional labs today     Follow up as needed  No scheduled follow up         Treatment Summary has been discussed and given to patient: N/A      Current Labs:   Hospital Outpatient Visit on 07/02/2024   Component Date Value Ref Range Status    KAPPA FREE LIGHT CHAIN 07/02/2024 11.6  2.4 - 20.7 mg/L Final    LAMBDA FREE LIGHT CHAIN 07/02/2024 5.8  4.2 - 27.7 mg/L Final    Kappa/Lambda Fluid C Ratio 07/02/2024 2.0 (H)  0.2 - 0.8   Final    Iron 07/02/2024 142 (H)  35 - 100 ug/dL Final    TIBC 07/02/2024 323  240 - 450 ug/dL Final    Iron % Saturation 07/02/2024 44  20 - 50 % Final    UIBC 07/02/2024 181.0  112.0 - 347.0 ug/dL Final    WBC 07/02/2024 7.2  4.3 - 11.1 K/uL Final    RBC 07/02/2024 4.61  4.05 - 5.2 M/uL Final    Hemoglobin 07/02/2024 13.1  11.7 - 15.4 g/dL Final    Hematocrit 07/02/2024 39.2  35.8 - 46.3 % Final    MCV 07/02/2024 85.0  82.0 - 102.0 FL Final    MCH 07/02/2024 28.4  26.1 - 32.9 PG Final    MCHC 07/02/2024 33.4  31.4 - 35.0 g/dL Final    RDW 07/02/2024 12.7  11.9 - 14.6 % Final    Platelets 07/02/2024 243  150 - 450 K/uL Final    MPV 07/02/2024 9.9  9.4 - 12.3 FL Final    nRBC 07/02/2024 0.00  0.0 - 0.2 K/uL Final    **Note: Absolute NRBC parameter is now reported with Hemogram**    Neutrophils % 07/02/2024 57  43 - 78 % Final    Lymphocytes % 07/02/2024 28  13 - 44 % Final    Monocytes % 07/02/2024 10  4.0 - 12.0 % Final    Eosinophils % 07/02/2024 4  0.5 - 7.8 % Final    Basophils % 07/02/2024 1  0.0 - 2.0 % Final    Immature Granulocytes % 07/02/2024 0  0.0 - 5.0 % Final    Neutrophils Absolute 07/02/2024 4.1  1.7 - 8.2 K/UL Final    Lymphocytes Absolute 07/02/2024 2.0  0.5 - 4.6 K/UL Final    Monocytes Absolute 07/02/2024 0.7  0.1 - 1.3 K/UL Final    Eosinophils Absolute 07/02/2024

## 2024-07-02 NOTE — PROGRESS NOTES
HISTORY OF PRESENT ILLNESS  irene is a 19 y.o. y.o. 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  Reports having a 10-day hx of feeling fatigued, weak, diarrhea and nausea.    At that time, she was overwhelmed with right-sided upper and lower quadrant abdominal pain which lasted for about 15 minutes until her father gave her a pain medicine.  Since that time she has had nausea and diarrhea daily.  Denies BRB or black stools just simply loose watery diarrhea.    She has had nausea and vomiting as well during these periods although not as frequently as a loose stool. Denies flank pain or urinary symptoms.    Denies fever, chills, night sweats or cough.  Her diet has not changed.  She did switch brands of a probiotic about 3 weeks ago.     She has been seeing the obstetrician for pathology proven endometriosis.  She has multiple food allergies and would like referred to an allergist.  She has questions about nutrition particular with her dietary restrictions and would like to be referred to a nutritionist.     She has a history of eosinophilic esophagitis and does not want to return to [facility]. She would like to be referred to a different gastroenterologist.     Assessment/Plan:  Obtain an ultrasound to rule out biliary pathology or any other abnormalities palpated on exam today.  This may end up being endometriosis related but it could be IBS.    Recommended a prudent diet, high-fiber but begin WelChol 2 tablets a day until we get more information back.  If proven to be IBS,

## 2024-07-02 NOTE — PROGRESS NOTES
Anjana Mercado  : 2005  Primary: [facility] ([facility])  Secondary:  [facility] @ [address]  [facility] [address]  Phone: [phone]  Fax: [phone] Plan Frequency: 1x/week for 90 days  Plan of Care/Certification Expiration Date: 24        Plan of Care/Certification Expiration Date:  Plan of Care/Certification Expiration Date: 24    Frequency/Duration: Plan Frequency: 1x/week for 90 days      Time In/Out:   Time In: 1604  Time Out: 1710      PT Visit Info:    Total # of Visits Approved: 60 (hard max, combined)  Progress Note Due Date: 24  Total # of Visits to Date: 2      Visit Count:  2    OUTPATIENT PHYSICAL THERAPY:   Treatment Note 2024       Episode  (PFPT)               Treatment Diagnosis:    Other muscle spasm  Other lack of coordination  Contributing Diagnosis:  Constipation, unspecified (K59.00), Endometriosis, unspecified (N80.9), Hesitancy of micturition (R39.11), Pelvic and perineal pain (R10.2), and Pelvic floor dysfunction in female (M62.98)  Medical/Referring Diagnosis:    Pelvic and perineal pain [R10.2]  Endometriosis, unspecified [N80.9]      Referring Physician:  Karen Burton MD MD Orders:  PT Eval and Treat   Return MD Appt: 10/22/24   Date of Onset:  Onset Date: 11 (approximate date - ~6 years old)     Allergies:   Ciprofloxacin  Restrictions/Precautions:   None      Interventions Planned (Treatment may consist of any combination of the following):     See Assessment Note    Subjective Comments:   Endometriosis, ex lap in , pelvic (lower ab, perineum, low back) pain worse with prolonged positioning or heavy activity, better with stretches and biofreeze patches, never been sexually active, pelvic exams painful, currently on orilissa (no period right now)    Pain: seems slightly less when doing DB at work  HEP: has been doing breathing exercises standing at work and when she lays

## 2024-07-02 NOTE — PROGRESS NOTES
Anjana Mercado  : 2005  Primary: [facility] ([facility])  Secondary:  [facility] @ [address]  [facility] [address]  Phone: [phone]  Fax: [phone] Plan Frequency: 1x/week for 90 days  Plan of Care/Certification Expiration Date: 24        Plan of Care/Certification Expiration Date:  Plan of Care/Certification Expiration Date: 24    Frequency/Duration: Plan Frequency: 1x/week for 90 days      Time In/Out:      ***    PT Visit Info:    Total # of Visits Approved: 60 (hard max, combined)  Progress Note Due Date: 24  Total # of Visits to Date: 1    ***  Visit Count:  2    OUTPATIENT PHYSICAL THERAPY:   Treatment Note 2024       Episode  (PFPT)               Treatment Diagnosis:    Other muscle spasm  Other lack of coordination  Contributing Diagnosis:  Constipation, unspecified (K59.00), Endometriosis, unspecified (N80.9), Hesitancy of micturition (R39.11), Pelvic and perineal pain (R10.2), and Pelvic floor dysfunction in female (M62.98)  Medical/Referring Diagnosis:    Pelvic and perineal pain [R10.2]  Endometriosis, unspecified [N80.9]      Referring Physician:  Karen Burton MD MD Orders:  PT Eval and Treat   Return MD Appt: 10/22/24   Date of Onset:  Onset Date: 11 (approximate date - ~6 years old)     Allergies:   Ciprofloxacin  Restrictions/Precautions:   None      Interventions Planned (Treatment may consist of any combination of the following):     See Assessment Note    Subjective Comments:   Endometriosis, ex lap in , pelvic (lower ab, perineum, low back) pain worse with prolonged positioning or heavy activity, better with stretches and biofreeze patches, never been sexually active, pelvic exams painful, currently on orilissa (no period right now)    Pain: ***  HEP: ***    Initial Pain Level:      /10***  Post Session Pain Level:        /10***  Medications Last Reviewed:  2024  Updated

## 2024-07-03 LAB
ANA SER QL: NEGATIVE
DSDNA AB SER-ACNC: <1 IU/ML (ref 0–9)
Lab: NORMAL
Lab: NORMAL
REFERENCE LAB: NORMAL
TRANSFERRIN SERPL-SCNC: 14.3 NMOL/L (ref 12.2–27.3)

## 2024-07-05 LAB
HGB A MFR BLD: 97.7 % (ref 96.4–98.8)
HGB A2 MFR BLD COLUMN CHROM: 2.3 % (ref 1.8–3.2)
HGB F MFR BLD: 0 % (ref 0–2)
HGB FRACT BLD-IMP: NORMAL
HGB S MFR BLD: 0 %

## 2024-07-08 LAB
ALBUMIN SERPL ELPH-MCNC: 4.6 G/DL (ref 2.9–4.4)
ALBUMIN/GLOB SERPL: 2.1 (ref 0.7–1.7)
ALPHA1 GLOB SERPL ELPH-MCNC: 0.2 G/DL (ref 0–0.4)
ALPHA2 GLOB SERPL ELPH-MCNC: 0.6 G/DL (ref 0.4–1)
B-GLOBULIN SERPL ELPH-MCNC: 0.8 G/DL (ref 0.7–1.3)
GAMMA GLOB SERPL ELPH-MCNC: 0.6 G/DL (ref 0.4–1.8)
GLOBULIN SER-MCNC: 2.2 G/DL (ref 2.2–3.9)
IGA SERPL-MCNC: 31 MG/DL (ref 87–352)
IGG SERPL-MCNC: 609 MG/DL (ref 586–1602)
IGM SERPL-MCNC: 106 MG/DL (ref 26–217)
INTERPRETATION SERPL IEP-IMP: ABNORMAL
M PROTEIN SERPL ELPH-MCNC: ABNORMAL G/DL
PROT SERPL-MCNC: 6.8 G/DL (ref 6–8.5)

## 2024-07-09 ENCOUNTER — HOSPITAL ENCOUNTER (OUTPATIENT)
Dept: PHYSICAL THERAPY | Age: 19
Setting detail: RECURRING SERIES
Discharge: HOME | End: 2024-07-12
Payer: COMMERCIAL

## 2024-07-09 PROCEDURE — 97530 THERAPEUTIC ACTIVITIES: CPT

## 2024-07-09 PROCEDURE — 97140 MANUAL THERAPY 1/> REGIONS: CPT

## 2024-07-09 PROCEDURE — 97110 THERAPEUTIC EXERCISES: CPT

## 2024-07-09 ASSESSMENT — PAIN SCALES - GENERAL: PAINLEVEL_OUTOF10: 0

## 2024-07-09 NOTE — PROGRESS NOTES
Anjana Mercado  : 2005  Primary: [facility] ([facility])  Secondary:  [facility] @ [address]  [facility] [address]  Phone: [phone]  Fax: [phone] Plan Frequency: 1x/week for 90 days  Plan of Care/Certification Expiration Date: 24        Plan of Care/Certification Expiration Date:  Plan of Care/Certification Expiration Date: 24    Frequency/Duration: Plan Frequency: 1x/week for 90 days      Time In/Out:   Time In: 1411  Time Out: 1508     PT Visit Info:    Total # of Visits Approved: 60 (hard max, combined)  Progress Note Due Date: 24  Total # of Visits to Date: 3      Visit Count:  3    OUTPATIENT PHYSICAL THERAPY:   Treatment Note 2024       Episode  (PFPT)               Treatment Diagnosis:    Other muscle spasm  Other lack of coordination  Contributing Diagnosis:  Constipation, unspecified (K59.00), Endometriosis, unspecified (N80.9), Hesitancy of micturition (R39.11), Pelvic and perineal pain (R10.2), and Pelvic floor dysfunction in female (M62.98)  Medical/Referring Diagnosis:    Pelvic and perineal pain [R10.2]  Endometriosis, unspecified [N80.9]      Referring Physician:  Karen Burton MD MD Orders:  PT Eval and Treat   Return MD Appt: 10/22/24   Date of Onset:  Onset Date: 11 (approximate date - ~6 years old)     Allergies:   Ciprofloxacin  Restrictions/Precautions:   None      Interventions Planned (Treatment may consist of any combination of the following):     See Assessment Note    Subjective Comments:   Endometriosis, ex lap in , pelvic (lower ab, perineum, low back) pain worse with prolonged positioning or heavy activity, better with stretches and biofreeze patches, never been sexually active, pelvic exams painful, currently on orilissa (no period right now)    Pain: pain has been good this week  HEP: kneeling hip flexor stretch at work and that is helpful, this plus DB at work has made

## 2024-07-11 ENCOUNTER — PREP FOR PROCEDURE (OUTPATIENT)
Dept: GASTROENTEROLOGY | Age: 19
End: 2024-07-11

## 2024-07-11 ENCOUNTER — OFFICE VISIT (OUTPATIENT)
Dept: GASTROENTEROLOGY | Age: 19
End: 2024-07-11
Payer: COMMERCIAL

## 2024-07-11 VITALS
WEIGHT: 115 LBS | DIASTOLIC BLOOD PRESSURE: 75 MMHG | TEMPERATURE: 98 F | BODY MASS INDEX: 19.63 KG/M2 | HEIGHT: 64 IN | OXYGEN SATURATION: 95 % | SYSTOLIC BLOOD PRESSURE: 119 MMHG | RESPIRATION RATE: 16 BRPM | HEART RATE: 72 BPM

## 2024-07-11 DIAGNOSIS — R17 ELEVATED BILIRUBIN: ICD-10-CM

## 2024-07-11 DIAGNOSIS — K20.0 EOSINOPHILIC ESOPHAGITIS: ICD-10-CM

## 2024-07-11 DIAGNOSIS — R17 ELEVATED BILIRUBIN: Primary | ICD-10-CM

## 2024-07-11 LAB
BILIRUB DIRECT SERPL-MCNC: 0.3 MG/DL (ref 0–0.4)
BILIRUB INDIRECT SERPL-MCNC: 1.5 MG/DL (ref 0–1.1)
BILIRUB SERPL-MCNC: 1.8 MG/DL (ref 0–1.2)

## 2024-07-11 PROCEDURE — 99204 OFFICE O/P NEW MOD 45 MIN: CPT | Performed by: STUDENT IN AN ORGANIZED HEALTH CARE EDUCATION/TRAINING PROGRAM

## 2024-07-11 PROCEDURE — 1036F TOBACCO NON-USER: CPT | Performed by: STUDENT IN AN ORGANIZED HEALTH CARE EDUCATION/TRAINING PROGRAM

## 2024-07-11 PROCEDURE — G8427 DOCREV CUR MEDS BY ELIG CLIN: HCPCS | Performed by: STUDENT IN AN ORGANIZED HEALTH CARE EDUCATION/TRAINING PROGRAM

## 2024-07-11 PROCEDURE — G8420 CALC BMI NORM PARAMETERS: HCPCS | Performed by: STUDENT IN AN ORGANIZED HEALTH CARE EDUCATION/TRAINING PROGRAM

## 2024-07-11 NOTE — PROGRESS NOTES
Anjana Mercado is 19 y.o. y/o female here for initial evaluation.     Referral  Diagnosis   K20.0 (ICD-10-CM) - Eosinophilic esophagitis     Progress note 4/30/2024 Klaus Snyder MD   Anjana Mercado is a 19 y.o. female presents today with medical problems and to obtain refills if necessary, and they are also meeting me as their new physician for the first time as their initial visittoday for a 10-day history of feeling fatigued, weak, diarrhea and nausea.  She was essentially in her normal state of health until 10 days ago when she was overwhelmed with right-sided upper and lower quadrant abdominal pain which lasted for about 15 minutes until her father gave her a pain medicine.  Since that time she has had nausea and diarrhea essentially every day.  Sometimes the diarrhea is preceded by cramps sometimes not.  Does seem to somehow be related to eating perhaps 20 to 40 minutes later these episodes occur.  There is been no bright red blood or black stools just simply loose watery diarrhea.  She has had nausea and vomiting as well during these periods although not as frequently as a loose stools.  There is been no flank pain or urinary symptoms.  No fever, chills, night sweats or cough.  Her diet has not changed.  She did switch brands of a probiotic about 3 weeks ago   She has a history of eosinophilic esophagitis and does not want to return to [facility] he would like to be referred to a different gastroenterologist     US ABDOMEN COMPLETE 5/3/2024  IMPRESSION:  1. Nonspecific hepatomegaly.  2. Examination of the right lower quadrant of the abdomen reveals no  abnormality.    EGD/ colonoscopy 11/22/2019 Arsenio Vergara MD    Findings:      The examined esophagus was normal. Biopsies were taken with a cold      forceps for histology.      The entire examined stomach was normal. Biopsies were taken with a      cold forceps for histology.      The examined duodenum was normal. Biopsies were taken with a cold

## 2024-07-15 ENCOUNTER — TELEPHONE (OUTPATIENT)
Dept: GASTROENTEROLOGY | Age: 19
End: 2024-07-15

## 2024-07-15 NOTE — TELEPHONE ENCOUNTER
Called pt and informed her that based on their recent lab results, Dr. Galdamez states it confirms the diagnosis of gilbert's disease which does not require further testing. Pt verbalized understanding.    ----- Message from Osmany Galdamez MD sent at 7/15/2024 12:12 PM EDT -----  This lab confirms the presence of Gilbert's disease.  No more test is needed at this time.

## 2024-07-15 NOTE — PERIOP NOTE
Patient verified name, , and procedure.    Type: 1a; abbreviated assessment per anesthesia guidelines  Labs per surgeon: unknown; no orders received    Labs per anesthesia: none indicated    Patient reports that she has a heart murmur. She states that she was \"supposed to get an ECHO 2 years ago\" but \"was never able to do it because her work schedule\". Per PCP note 22 in Care Everywhere \"Very faint systolic murmur noted\". Patient denies any recent dizziness, SOB, or CP. She states she can climb at least 2 flights of stairs without shortness of breath. Chart sent for anesthesia to review and chart flagged for charge nurse.      Instructed pt that they will be notified by the Gi Lab for time of arrival. If any questions please call the GI lab at [phone].    Follow diet and prep instructions per office.   Bath or shower the night before and the am of surgery with antibacterial soap. No lotions, oils, powders, cologne on skin. No make up, eye make up or jewelry. Wear loose fitting comfortable, clean clothing.     Must have adult present in building the entire time .     Medications for the day of procedure bupropion, Welchol, hydroxyzine (if needed)    Hold vitamins and supplements 7 day and NSAIDS 5 days. Do not take Orlissa on the morning of procedure.    The following discharge instructions reviewed with patient: medication given during procedure may cause drowsiness for several hours, therefore, do not drive or operate machinery for remainder of the day, no alcohol on the day of your procedure, resume regular diet and activity unless otherwise directed, for mild sore throat you may use Cepacol throat lozenges or warm salt water gargles as needed, call your physician for any problems or questions. Patient verbalizes understanding.

## 2024-07-16 RX ORDER — SODIUM CHLORIDE 0.9 % (FLUSH) 0.9 %
5-40 SYRINGE (ML) INJECTION EVERY 12 HOURS SCHEDULED
Status: CANCELLED | OUTPATIENT
Start: 2024-07-16

## 2024-07-16 RX ORDER — SODIUM CHLORIDE 9 MG/ML
25 INJECTION, SOLUTION INTRAVENOUS PRN
Status: CANCELLED | OUTPATIENT
Start: 2024-07-16

## 2024-07-16 RX ORDER — SODIUM CHLORIDE 0.9 % (FLUSH) 0.9 %
5-40 SYRINGE (ML) INJECTION PRN
Status: CANCELLED | OUTPATIENT
Start: 2024-07-16

## 2024-07-17 ENCOUNTER — TELEPHONE (OUTPATIENT)
Dept: GASTROENTEROLOGY | Age: 19
End: 2024-07-17

## 2024-07-17 NOTE — TELEPHONE ENCOUNTER
Patient called in with pre assessment questions surgery with Rudolph on 07/23/2024 / advised patient I would reach out to prep assessment and call her back

## 2024-07-18 ENCOUNTER — HOSPITAL ENCOUNTER (OUTPATIENT)
Dept: PHYSICAL THERAPY | Age: 19
Setting detail: RECURRING SERIES
Discharge: HOME | End: 2024-07-21
Payer: COMMERCIAL

## 2024-07-18 PROCEDURE — 97140 MANUAL THERAPY 1/> REGIONS: CPT

## 2024-07-18 PROCEDURE — 97530 THERAPEUTIC ACTIVITIES: CPT

## 2024-07-18 PROCEDURE — 97110 THERAPEUTIC EXERCISES: CPT

## 2024-07-18 NOTE — PROGRESS NOTES
Pathology/Anatomy/PFM Function Reviewed; discussed impact of chronic pain on muscular response and PFM tension 6/27/24   Bladder health education     Urinary urge suppression     The knack     Voiding strategies     Nocturia tips     Bowel/Bladder log     Bowel health education     Constipation care     Diarrhea/Fecal leakage     Colonic massage     Toilet positioning     Defecation dynamics     Sources of fiber     Return to intercourse/vaginal trainers/wand Discussed vaginal dilators/trainers per patient's request, reviewed types/brands and process for dilator use and progression, handouts and pamphlets provided, answered patient questions 7/18/24   Perineal massage     Sexual positioning     Lubricants/vaginal moisturizers     Vulvovaginal health/vaginal irritants     Body mechanics     Posture/ergonomics     Diaphragmatic breathing Practiced, handout provided, emphasis on PFM relaxation on the inhale  Practiced, reviewed mechanics  Practiced 6/27/24 7/2/24 7/9/24 7/18/24   Pain science education     Resources and technology     Other patient education Re: graded exposure: begin with external light touch at inner thighs, may progress to lower abdomen --> mons pubis --> perineum; emphasized no set pace with this but encouraged consistency; discussed impact of fear/anticipation on nervous and muscular systems  Reviewed above and progression techniques for graded exposure 7/2/24 7/9/24     MANUAL THERAPY: (30 minutes): Soft tissue mobilization was utilized and necessary because of the patient's painful spasm and restricted motion of soft tissue.   Date Type Location Comments   7/18/2024 Internal assessment/treatment Via vaginal canal SP and SCS to superficial and intermediate PFM     Hip STM and skin rolling to bilateral hip adductors  STM to L TFL                                 (Used abbreviations: MET - muscle energy technique; SCS- Strain counter strain; CTM-Connective tissue

## 2024-07-19 ENCOUNTER — TELEPHONE (OUTPATIENT)
Dept: OBGYN CLINIC | Age: 19
End: 2024-07-19

## 2024-07-19 NOTE — TELEPHONE ENCOUNTER
----- Message from Anjana Mercado sent at 7/19/2024 11:26 AM EDT -----  Regarding: Dilators   Contact: [phone]  Meliton Burton. Ms Verma has advised me to look over procuring some dilators for my pelvic floor therapy. However, the brand I have my eyes on requires a prescription. Ms Verma has sent me a link for filling out a script and I was wondering if you could fill it out for me.  [email or web address]    Best regards,   Anjana Mercado

## 2024-07-22 RX ORDER — IPRATROPIUM BROMIDE AND ALBUTEROL SULFATE 2.5; .5 MG/3ML; MG/3ML
1 SOLUTION RESPIRATORY (INHALATION)
Status: CANCELLED | OUTPATIENT
Start: 2024-07-22 | End: 2024-07-23

## 2024-07-22 RX ORDER — NALOXONE HYDROCHLORIDE 0.4 MG/ML
INJECTION, SOLUTION INTRAMUSCULAR; INTRAVENOUS; SUBCUTANEOUS PRN
Status: CANCELLED | OUTPATIENT
Start: 2024-07-22

## 2024-07-22 NOTE — PROGRESS NOTES
RN called Pt to confirm appointment time of 1148, arrival time 1015, location,  requirement, and instructions for registration at the hospital.  Pt verbalized understanding.

## 2024-07-23 ENCOUNTER — ANESTHESIA (OUTPATIENT)
Dept: ENDOSCOPY | Age: 19
End: 2024-07-23
Payer: COMMERCIAL

## 2024-07-23 ENCOUNTER — HOSPITAL ENCOUNTER (OUTPATIENT)
Age: 19
Setting detail: OUTPATIENT SURGERY
Discharge: HOME | End: 2024-07-23
Attending: STUDENT IN AN ORGANIZED HEALTH CARE EDUCATION/TRAINING PROGRAM | Admitting: STUDENT IN AN ORGANIZED HEALTH CARE EDUCATION/TRAINING PROGRAM
Payer: COMMERCIAL

## 2024-07-23 ENCOUNTER — ANESTHESIA EVENT (OUTPATIENT)
Dept: ENDOSCOPY | Age: 19
End: 2024-07-23
Payer: COMMERCIAL

## 2024-07-23 VITALS
HEART RATE: 66 BPM | WEIGHT: 114 LBS | BODY MASS INDEX: 19.46 KG/M2 | DIASTOLIC BLOOD PRESSURE: 55 MMHG | RESPIRATION RATE: 16 BRPM | TEMPERATURE: 98.2 F | OXYGEN SATURATION: 98 % | HEIGHT: 64 IN | SYSTOLIC BLOOD PRESSURE: 88 MMHG

## 2024-07-23 PROCEDURE — 6360000002 HC RX W HCPCS: Performed by: NURSE ANESTHETIST, CERTIFIED REGISTERED

## 2024-07-23 PROCEDURE — 7100000011 HC PHASE II RECOVERY - ADDTL 15 MIN: Performed by: STUDENT IN AN ORGANIZED HEALTH CARE EDUCATION/TRAINING PROGRAM

## 2024-07-23 PROCEDURE — 2709999900 HC NON-CHARGEABLE SUPPLY: Performed by: STUDENT IN AN ORGANIZED HEALTH CARE EDUCATION/TRAINING PROGRAM

## 2024-07-23 PROCEDURE — 88305 TISSUE EXAM BY PATHOLOGIST: CPT

## 2024-07-23 PROCEDURE — 7100000010 HC PHASE II RECOVERY - FIRST 15 MIN: Performed by: STUDENT IN AN ORGANIZED HEALTH CARE EDUCATION/TRAINING PROGRAM

## 2024-07-23 PROCEDURE — 3609012400 HC EGD TRANSORAL BIOPSY SINGLE/MULTIPLE: Performed by: STUDENT IN AN ORGANIZED HEALTH CARE EDUCATION/TRAINING PROGRAM

## 2024-07-23 PROCEDURE — 3700000000 HC ANESTHESIA ATTENDED CARE: Performed by: STUDENT IN AN ORGANIZED HEALTH CARE EDUCATION/TRAINING PROGRAM

## 2024-07-23 PROCEDURE — 2580000003 HC RX 258: Performed by: ANESTHESIOLOGY

## 2024-07-23 PROCEDURE — 2500000003 HC RX 250 WO HCPCS: Performed by: NURSE ANESTHETIST, CERTIFIED REGISTERED

## 2024-07-23 RX ORDER — LIDOCAINE HYDROCHLORIDE 20 MG/ML
INJECTION, SOLUTION EPIDURAL; INFILTRATION; INTRACAUDAL; PERINEURAL PRN
Status: DISCONTINUED | OUTPATIENT
Start: 2024-07-23 | End: 2024-07-23 | Stop reason: SDUPTHER

## 2024-07-23 RX ORDER — SODIUM CHLORIDE 0.9 % (FLUSH) 0.9 %
5-40 SYRINGE (ML) INJECTION EVERY 12 HOURS SCHEDULED
Status: DISCONTINUED | OUTPATIENT
Start: 2024-07-23 | End: 2024-07-23 | Stop reason: HOSPADM

## 2024-07-23 RX ORDER — PROPOFOL 10 MG/ML
INJECTION, EMULSION INTRAVENOUS CONTINUOUS PRN
Status: DISCONTINUED | OUTPATIENT
Start: 2024-07-23 | End: 2024-07-23 | Stop reason: SDUPTHER

## 2024-07-23 RX ORDER — SODIUM CHLORIDE 0.9 % (FLUSH) 0.9 %
5-40 SYRINGE (ML) INJECTION PRN
Status: DISCONTINUED | OUTPATIENT
Start: 2024-07-23 | End: 2024-07-23 | Stop reason: HOSPADM

## 2024-07-23 RX ORDER — SODIUM CHLORIDE, SODIUM LACTATE, POTASSIUM CHLORIDE, CALCIUM CHLORIDE 600; 310; 30; 20 MG/100ML; MG/100ML; MG/100ML; MG/100ML
INJECTION, SOLUTION INTRAVENOUS CONTINUOUS
Status: DISCONTINUED | OUTPATIENT
Start: 2024-07-23 | End: 2024-07-23 | Stop reason: HOSPADM

## 2024-07-23 RX ORDER — SODIUM CHLORIDE 9 MG/ML
25 INJECTION, SOLUTION INTRAVENOUS PRN
Status: DISCONTINUED | OUTPATIENT
Start: 2024-07-23 | End: 2024-07-23 | Stop reason: HOSPADM

## 2024-07-23 RX ORDER — SODIUM CHLORIDE 9 MG/ML
INJECTION, SOLUTION INTRAVENOUS PRN
Status: DISCONTINUED | OUTPATIENT
Start: 2024-07-23 | End: 2024-07-23 | Stop reason: HOSPADM

## 2024-07-23 RX ORDER — FEXOFENADINE HCL 60 MG/1
60 TABLET, FILM COATED ORAL DAILY
COMMUNITY

## 2024-07-23 RX ORDER — LIDOCAINE HYDROCHLORIDE 10 MG/ML
1 INJECTION, SOLUTION INFILTRATION; PERINEURAL
Status: DISCONTINUED | OUTPATIENT
Start: 2024-07-23 | End: 2024-07-23 | Stop reason: HOSPADM

## 2024-07-23 RX ADMIN — PROPOFOL 100 MG: 10 INJECTION, EMULSION INTRAVENOUS at 12:00

## 2024-07-23 RX ADMIN — PROPOFOL 100 MG: 10 INJECTION, EMULSION INTRAVENOUS at 11:59

## 2024-07-23 RX ADMIN — PROPOFOL 200 MCG/KG/MIN: 10 INJECTION, EMULSION INTRAVENOUS at 11:58

## 2024-07-23 RX ADMIN — LIDOCAINE HYDROCHLORIDE 100 MG: 20 INJECTION, SOLUTION EPIDURAL; INFILTRATION; INTRACAUDAL; PERINEURAL at 11:58

## 2024-07-23 RX ADMIN — SODIUM CHLORIDE, POTASSIUM CHLORIDE, SODIUM LACTATE AND CALCIUM CHLORIDE: 600; 310; 30; 20 INJECTION, SOLUTION INTRAVENOUS at 11:01

## 2024-07-23 NOTE — DISCHARGE INSTRUCTIONS
Gastrointestinal Esophagogastroduodenoscopy (EGD)/ Endoscopic Ultrasound(EUS)- Upper Exam Discharge Instructions    1. Call Dr. Galdamez @ [phone]  for any problems or questions.  2. Contact the doctor's office for follow up appointment as directed.  3. Medication may cause drowsiness for several hours, therefore, do not drive or operate machinery for remainder of the day.  4. No alcohol today.  5. Do not make any important decisions such as signing legal paperwork.  6. Ordinarily, you may resume regular diet and activity after exam unless otherwise specified by your physician.  7. For mild soreness in your throat you may use Cepacol throat lozenges or warm  salt-water gargles as needed.    Any additional instructions:   Postoperative Diagnosis:  EoE     Recommendations:   Based on the biopsy results we will decide if you need to restart PPI.  Avoid cow's milk products.   Follow up in the clinic in 1 year.  Repeat EGD in 2 years.   Await for biopsy results, you will receive a pathology letter within 2 weeks.         Instructions given to Anjana Mercado and other family members.

## 2024-07-23 NOTE — OP NOTE
Endoscopy Note          Operative Report    Patient: Anjana Mercado MRN: [id number]      YOB: 2005  Age: 19 y.o.  Sex: female            Indications:  History of EoE    Preoperative Evaluation: The patient was evaluated prior to the procedure in the GI lab admission area, the patient ASA was recorded .  Consent was obtained from the patient with the risk of perforation bleeding and aspiration.    Anesthesia: CHRISTOPHER-per anesthesia  Complications: None  EBL: Unremarkable  Procedure: The patient was sedated in the left lateral decubitus position. Scope was advance from the mouth to the third portion of the duodenum. The scope was withdrawn to the stomach and a refroflexed view was performed.     Findings:  Few feline changes with white plagues (few), linear furrows consistent with prior history of EoE.  Empiric dilation was done with 51 Fr Savary, no mucosal rent on postdilation exam.  Biopsies obtained for disease activity assessment.  Normal stomach and duodenum.     Postoperative Diagnosis:  EoE    Recommendations:   Based on the biopsy results we will decide if you need to restart PPI.  Avoid cow's milk products.   Follow up in the clinic in 1 year.  Repeat EGD in 2 years.   Await for biopsy results, you will receive a pathology letter within 2 weeks.     Signed By:  Osmany Galdamez MD     July 23, 2024

## 2024-07-23 NOTE — ANESTHESIA POSTPROCEDURE EVALUATION
Department of Anesthesiology  Postprocedure Note    Patient: Anjana Mercado  MRN: [id number]  YOB: 2005  Date of evaluation: 7/23/2024    Procedure Summary       Date: 07/23/24 Room / Location: [place] [address] / [facility] ENDOSCOPY    Anesthesia Start: 1150 Anesthesia Stop: 1212    Procedure: ESOPHAGOGASTRODUODENOSCOPY (Upper GI Region) Diagnosis:       Eosinophilic esophagitis      (Eosinophilic esophagitis [K20.0])    Surgeons: Osmany Galdamez MD Responsible Provider: Jovanny Sheridan MD    Anesthesia Type: TIVA ASA Status: 2            Anesthesia Type: TIVA    Vernell Phase I: Vernell Score: 10    Vernell Phase II:      Anesthesia Post Evaluation    Patient location during evaluation: bedside  Patient participation: complete - patient participated  Level of consciousness: awake and alert  Airway patency: patent  Nausea & Vomiting: no nausea  Cardiovascular status: hemodynamically stable  Respiratory status: acceptable, nonlabored ventilation and spontaneous ventilation  Hydration status: euvolemic    No notable events documented.  
yes

## 2024-07-23 NOTE — ANESTHESIA PRE PROCEDURE
Department of Anesthesiology  Preprocedure Note       Name:  Anjana Mercado   Age:  19 y.o.  :  2005                                          MRN:  [id number]         Date:  2024      Surgeon: Surgeon(s):  Osmany Galdamez MD    Procedure: Procedure(s):  ESOPHAGOGASTRODUODENOSCOPY *interviewed    Medications prior to admission:   Prior to Admission medications    Medication Sig Start Date End Date Taking? Authorizing Provider   fexofenadine (ALLEGRA ALLERGY) 60 MG tablet Take 1 tablet by mouth daily   Yes Jose Peterson MD   fluticasone (VERAMYST) 27.5 MCG/SPRAY nasal spray 2 sprays by Each Nostril route daily   Yes Jose Peterson MD   vitamin B-12 (CYANOCOBALAMIN) 500 MCG tablet Take 2 tablets by mouth daily 24  Kenny Lr MD   Elagolix Sodium 150 MG TABS Take 150 mg by mouth daily 24   Karen Burton MD   colesevelam (WELCHOL) 625 MG tablet Take two tablets daily 24   Klaus Snyder MD   melatonin 5 MG TABS tablet Take 1 tablet by mouth nightly 24   Jose Peterson MD   Probiotic Product (PROBIOTIC PO) Take by mouth    Jose Peterson MD   levonorgestrel (MIRENA) IUD 52 mg 1 each by IntraUTERine route once 23   Jose Peterson MD   buPROPion (WELLBUTRIN XL) 150 MG extended release tablet Take 1 tablet by mouth every morning    Jose Peterson MD   hydrOXYzine (VISTARIL) 25 MG capsule Take 1 capsule by mouth 2 times daily as needed 21   Automatic Reconciliation, Ar       Current medications:    Current Facility-Administered Medications   Medication Dose Route Frequency Provider Last Rate Last Admin   • lidocaine 1 % injection 1 mL  1 mL IntraDERmal Once PRN Jovanny Sheridan MD       • lactated ringers IV soln infusion   IntraVENous Continuous Jovanny Sheridan MD       • sodium chloride flush 0.9 % injection 5-40 mL  5-40 mL IntraVENous 2 times per day Jovanny Sheridan MD       • sodium chloride flush 0.9 % injection

## 2024-07-24 DIAGNOSIS — R19.7 DIARRHEA, UNSPECIFIED TYPE: ICD-10-CM

## 2024-07-24 RX ORDER — COLESEVELAM 180 1/1
625 TABLET ORAL 2 TIMES DAILY WITH MEALS
Qty: 180 TABLET | Refills: 0 | Status: SHIPPED | OUTPATIENT
Start: 2024-07-24

## 2024-07-24 NOTE — TELEPHONE ENCOUNTER
Patient called requesting for the provider to re-send her colesevelam (WELCHOL) 625 MG tablet to the pharmacy. Stated they sent her a message stating there unable to fill it unless its for a 90 day supply NOT 60 days.

## 2024-07-25 ENCOUNTER — HOSPITAL ENCOUNTER (OUTPATIENT)
Dept: PHYSICAL THERAPY | Age: 19
Setting detail: RECURRING SERIES
Discharge: HOME | End: 2024-07-28
Payer: COMMERCIAL

## 2024-07-25 DIAGNOSIS — K20.0 EOSINOPHILIC ESOPHAGITIS: Primary | ICD-10-CM

## 2024-07-25 PROCEDURE — 97110 THERAPEUTIC EXERCISES: CPT

## 2024-07-25 PROCEDURE — 97140 MANUAL THERAPY 1/> REGIONS: CPT

## 2024-07-25 RX ORDER — OMEPRAZOLE 40 MG/1
40 CAPSULE, DELAYED RELEASE ORAL
Qty: 90 CAPSULE | Refills: 1 | Status: SHIPPED | OUTPATIENT
Start: 2024-07-25 | End: 2025-01-21

## 2024-07-25 ASSESSMENT — PAIN SCALES - GENERAL: PAINLEVEL_OUTOF10: 0

## 2024-07-25 NOTE — PROGRESS NOTES
HEP  Recommendations/Intent for next treatment session: Next visit will focus on self-guided graded exposure to contact at perineum progressing to perineum/vaginal opening; PFM downtraining, manual techniques, assess hip stability, vaginal dilators as appropriate (if pt buys her own).  Variation from POC: N/A    >Total Treatment Billable Duration: 43 minutes  Time In: 1409  Time Out: 1455    Tammy Verma PT         Charge Capture  Events  meevl Portal  Appt Desk  Attendance Report     Future Appointments   Date Time Provider Department Center   7/30/2024  1:00 PM Tammy Verma, PT [facility]   8/6/2024 10:00 AM Tammy Verma, PT [facility]   8/13/2024  2:00 PM Tammy Verma, PT [facility]   8/20/2024  2:00 PM Tammy Verma, PT [facility]   8/27/2024  2:00 PM Tammy Verma, PT [facility]   10/22/2024 10:45 AM Karen Burton MD [facility]

## 2024-07-27 LAB
Lab: NORMAL
Lab: NORMAL
REFERENCE LAB: NORMAL

## 2024-07-30 ENCOUNTER — HOSPITAL ENCOUNTER (OUTPATIENT)
Dept: PHYSICAL THERAPY | Age: 19
Setting detail: RECURRING SERIES
Discharge: HOME | End: 2024-08-02
Payer: COMMERCIAL

## 2024-07-30 PROCEDURE — 97530 THERAPEUTIC ACTIVITIES: CPT

## 2024-07-30 PROCEDURE — 97140 MANUAL THERAPY 1/> REGIONS: CPT

## 2024-07-30 ASSESSMENT — PAIN SCALES - GENERAL: PAINLEVEL_OUTOF10: 0

## 2024-07-30 NOTE — PROGRESS NOTES
Anjana Mercado  : 2005  Primary: [facility]  Secondary:  [facility] @ [address]  [facility] [address]  Phone: [phone]  Fax: [phone] Plan Frequency: 1x/week for 90 days  Plan of Care/Certification Expiration Date: 24        Plan of Care/Certification Expiration Date:  Plan of Care/Certification Expiration Date: 24    Frequency/Duration: Plan Frequency: 1x/week for 90 days      Time In/Out:   Time In: 1306  Time Out: 1355     PT Visit Info:    Total # of Visits Approved: 60 (hard max, combined)  Progress Note Due Date: 24  Total # of Visits to Date: 6      Visit Count:  6    OUTPATIENT PHYSICAL THERAPY:   Treatment Note 2024       Episode  (PFPT)               Treatment Diagnosis:    Other muscle spasm  Other lack of coordination  Contributing Diagnosis:  Constipation, unspecified (K59.00), Endometriosis, unspecified (N80.9), Hesitancy of micturition (R39.11), Pelvic and perineal pain (R10.2), and Pelvic floor dysfunction in female (M62.98)  Medical/Referring Diagnosis:    Pelvic and perineal pain [R10.2]  Endometriosis, unspecified [N80.9]      Referring Physician:  Karen Burton MD MD Orders:  PT Eval and Treat   Return MD Appt: 10/22/24   Date of Onset:  Onset Date: 11 (approximate date - ~6 years old)     Allergies:   Ciprofloxacin  Restrictions/Precautions:   None      Interventions Planned (Treatment may consist of any combination of the following):     See Assessment Note    Subjective Comments:   Endometriosis, ex lap in , pelvic (lower ab, perineum, low back) pain worse with prolonged positioning or heavy activity, better with stretches and biofreeze patches, never been sexually active, pelvic exams painful, currently on orilissa (no period right now)    Starts school on 24  Getting new insurance    Pain: pain has been non existent the last couple of weeks  Has done

## 2024-08-06 ENCOUNTER — HOSPITAL ENCOUNTER (OUTPATIENT)
Dept: PHYSICAL THERAPY | Age: 19
Setting detail: RECURRING SERIES
Discharge: HOME | End: 2024-08-09
Payer: COMMERCIAL

## 2024-08-06 PROCEDURE — 97530 THERAPEUTIC ACTIVITIES: CPT

## 2024-08-06 PROCEDURE — 97140 MANUAL THERAPY 1/> REGIONS: CPT

## 2024-08-06 ASSESSMENT — PAIN SCALES - GENERAL: PAINLEVEL_OUTOF10: 0

## 2024-08-06 NOTE — PROGRESS NOTES
(improved from previous weeks)    Palpation: via vaginal canal  Superficial Pelvic Floor Musculature (PFM): Tender? Intermediate PFM Tender? Deep PFM Tender?   Superficial Transverse Perineal [x] Right  [x] Left  []DNT Deep Transverse Perineal [] Right  [] Left  []DNT Puborectalis [] Right  [] Left  []DNT   Ischiocavernosus [] Right  [] Left  []DNT Compressor Urethra [] Right  [] Left  [x]DNT Pubococcygeus [] Right  [] Left  []DNT   Bulbocavernosus [] Right  [] Left  []DNT   Iliococcygeus [] Right  [] Left  []DNT       Obturator Internus [] Right  [] Left  []DNT       Coccygeus [] Right  [] Left  []DNT       7/9/24:  Palpation: via vaginal canal  Superficial Pelvic Floor Musculature (PFM): Tender? Intermediate PFM Tender? Deep PFM Tender?   Superficial Transverse Perineal [] Right  [] Left  []DNT Deep Transverse Perineal [] Right  [] Left  []DNT Puborectalis [] Right  [] Left  [x]DNT   Ischiocavernosus [x] Right  [] Left  []DNT Compressor Urethra [] Right  [x] Left  []DNT Pubococcygeus [] Right  [] Left  [x]DNT   Bulbocavernosus [] Right  [x] Left  []DNT   Iliococcygeus [] Right  [] Left  [x]DNT       Obturator Internus [] Right  [] Left  [x]DNT       Coccygeus [] Right  [] Left  [x]DNT         7/2/24:  Tenderness at bilateral STP and perineal body (DNT other muscle groups)  Tenderness and TrPs at bilateral hip adductors, hip flexors (R>L), and hip abductors, as well as L sided lower abdomen    Treatment   THERAPEUTIC EXERCISE: (0 minutes): Exercises per grid below to improve mobility, strength, and coordination.  Required minimal visual, verbal, and tactile cues to promote proper body mechanics and promote proper body breathing techniques.  Progressed resistance, range, and repetitions as indicated.   Date:  7/2/24 Date:  7/9/24 Date:  7/18/24 Date:  7/25/24 Date:  7/30/24 Date:     Activity/Exercise Parameters Parameters Parameters Parameters Parameters Parameters   HEP Updated and reviewed Updated and reviewed

## 2024-08-13 ENCOUNTER — APPOINTMENT (OUTPATIENT)
Dept: PHYSICAL THERAPY | Age: 19
End: 2024-08-13
Payer: COMMERCIAL

## 2024-08-20 ENCOUNTER — HOSPITAL ENCOUNTER (OUTPATIENT)
Dept: PHYSICAL THERAPY | Age: 19
Setting detail: RECURRING SERIES
Discharge: HOME | End: 2024-08-23
Payer: COMMERCIAL

## 2024-08-20 PROCEDURE — 97140 MANUAL THERAPY 1/> REGIONS: CPT

## 2024-08-20 PROCEDURE — 97110 THERAPEUTIC EXERCISES: CPT

## 2024-08-20 ASSESSMENT — PAIN SCALES - GENERAL: PAINLEVEL_OUTOF10: 0

## 2024-08-20 NOTE — PROGRESS NOTES
August    Pain: pain has been pretty good recently    HEP: tries to do stretches 2x/day, consistent with this, going well  Graded exposure: hands over pubic area and inner thighs - starts with a little feeling of uneasiness but this subsides over time, feels less scared of people touching her    Starting on size 2, trying to go to size 3 but it's difficult (painful with attempt at initial insertion)    Initial Pain Level:     0/10  Post Session Pain Level:       0/10  Medications Last Reviewed:  6/27/2024  Updated Objective Findings:   See below    8/20/24:  Tenderness and tension present at bilateral hip adductors (minimal)  No tenderness but tension present at PFM, relaxation has improved    8/6/24:  Slight tenderness and tension present at bilateral hip adductors  Tenderness at PFM on initial insertion of treating finger, no pain with insertion of dilators today (size 1 or 2)    7/30/24:  Non tender at PFM  Minimal tension but reactivity of the muscles still present upon movement of treating finger    7/25/24:  Minimal tenderness at bilateral hip adductors, slight tension present at proximal adductors  No tenderness at bilateral hip flexors    Palpation: via vaginal canal (stretching pain at perineal body)  Superficial Pelvic Floor Musculature (PFM): Tender? Intermediate PFM Tender? Deep PFM Tender?   Superficial Transverse Perineal [] Right  [] Left (Tension noted, no pain) []DNT Deep Transverse Perineal [] Right  [] Left  []DNT Puborectalis [] Right  [] Left  []DNT   Ischiocavernosus [] Right  [] Left  []DNT Compressor Urethra [] Right  [] Left  []DNT Pubococcygeus [] Right  [] Left  []DNT   Bulbocavernosus [] Right  [] Left  []DNT   Iliococcygeus [] Right  [x] Left  []DNT       Obturator Internus [x] Right  [] Left  []DNT       Coccygeus [] Right  [] Left  [x]DNT         7/18/24:  Tenderness and TrPs at bilateral hip adductors and L hip flexor (TFL) (improved from previous weeks)    Palpation: via vaginal

## 2024-08-27 ENCOUNTER — APPOINTMENT (OUTPATIENT)
Dept: PHYSICAL THERAPY | Age: 19
End: 2024-08-27
Payer: COMMERCIAL

## 2024-09-13 ENCOUNTER — HOSPITAL ENCOUNTER (OUTPATIENT)
Dept: PHYSICAL THERAPY | Age: 19
Setting detail: RECURRING SERIES
Discharge: HOME | End: 2024-09-16
Payer: COMMERCIAL

## 2024-09-13 PROCEDURE — 97110 THERAPEUTIC EXERCISES: CPT

## 2024-09-13 PROCEDURE — 97140 MANUAL THERAPY 1/> REGIONS: CPT

## 2024-09-13 PROCEDURE — 97530 THERAPEUTIC ACTIVITIES: CPT

## 2024-09-13 ASSESSMENT — PAIN SCALES - GENERAL: PAINLEVEL_OUTOF10: 0

## 2024-09-30 DIAGNOSIS — N80.9 ENDOMETRIOSIS: Primary | ICD-10-CM

## 2024-09-30 DIAGNOSIS — R10.2 PELVIC PAIN: ICD-10-CM

## 2024-09-30 NOTE — TELEPHONE ENCOUNTER
Patient called and stated that a prior authorization is required with her pharmacy for Orilissa. Patient stated that her [facility] is [facility] at [address] [place] [address].

## 2024-09-30 NOTE — TELEPHONE ENCOUNTER
Called pt to confirm, pt stated her insurance is not contracted with [employer] anymore so the rx was over $200. Pt stated we should have new insurance, BC[employer]. Pt stated  was inactive in August 2024. Advised pt we can start PA but it can take up to a week to see if it is approved or not. Pt voiced understanding.

## 2024-10-01 NOTE — TELEPHONE ENCOUNTER
Patient LM wanting to make sure the PA for the Orilissa was sent.     Patient informed that we are waiting for the rx to be sent to correct pharmacy so that the PA can be generated. Patient voiced understanding. mc

## 2024-10-02 ENCOUNTER — TELEPHONE (OUTPATIENT)
Dept: OBGYN CLINIC | Age: 19
End: 2024-10-02

## 2024-10-02 RX ORDER — ELAGOLIX 150 MG/1
150 TABLET, FILM COATED ORAL DAILY
Qty: 30 TABLET | Refills: 9 | Status: SHIPPED | OUTPATIENT
Start: 2024-10-02

## 2024-10-02 NOTE — TELEPHONE ENCOUNTER
Called pt, informed pt that her PA was approved. Pt stated she got a call from her insurance stating it was approved.

## 2024-10-02 NOTE — TELEPHONE ENCOUNTER
Pending rx of Orilissa to correct provider as previous rx was attached to different provider.     Drug interaction warning appeared with patients Orilissa and IUD. In physicians note patient is to be on both therapies with AUB and endometriosis.

## 2024-10-18 ENCOUNTER — OFFICE VISIT (OUTPATIENT)
Dept: OBGYN CLINIC | Age: 19
End: 2024-10-18
Payer: COMMERCIAL

## 2024-10-18 VITALS
SYSTOLIC BLOOD PRESSURE: 102 MMHG | BODY MASS INDEX: 20.14 KG/M2 | DIASTOLIC BLOOD PRESSURE: 58 MMHG | HEIGHT: 64 IN | WEIGHT: 118 LBS

## 2024-10-18 DIAGNOSIS — Z12.4 PAP SMEAR FOR CERVICAL CANCER SCREENING: Primary | ICD-10-CM

## 2024-10-18 DIAGNOSIS — Z01.419 WOMEN'S ANNUAL ROUTINE GYNECOLOGICAL EXAMINATION: ICD-10-CM

## 2024-10-18 PROCEDURE — 99395 PREV VISIT EST AGE 18-39: CPT | Performed by: NURSE PRACTITIONER

## 2024-10-18 PROCEDURE — 99459 PELVIC EXAMINATION: CPT | Performed by: NURSE PRACTITIONER

## 2024-10-18 NOTE — PROGRESS NOTES
Patient here for AE.     Patient is wondering if donating blood can cause and endometriosis flare up-patient donated two days and had pain the rest of the day in the pelvic area. Denies pain today.     LAST PAP:  never     LAST MAMMO:  never     LMP:  No LMP recorded. (Menstrual status: IUD).    BIRTH CONTROL:  IUD    TOBACCO USE:  No    FAMILY HISTORY OF:   Breast Cancer:  No   Ovarian Cancer:  No   Uterine Cancer:  No   Colon Cancer:  No    Vitals:    10/18/24 1037   BP: (!) 102/58   Site: Right Upper Arm   Position: Sitting   Weight: 53.5 kg (118 lb)   Height: 1.626 m (5' 4\")        SAPNA FLETCHER RN  10/18/24  10:43 AM

## 2024-10-18 NOTE — ASSESSMENT & PLAN NOTE
Pap today  Declines std screening  Mammo n/a  Has Mirena IUD/Orilissa for endometriosis. Started June 2024  Gardasil - unsure, will check records and let us know  Rto 1 year

## 2024-10-18 NOTE — PROGRESS NOTES
Anjana Mercado is a 19 y.o.  who is here for AE    Pt in school for [employer]. Hx endometriosis - proven with surgery    No LMP recorded. (Menstrual status: IUD).    Menses:currently no menses with Mirena IUD/Orilissa started 2024    Menstrual Complaints: some spotting when she missed orilissa for a few days, one day of pelvic pain after donating blood    Birth Control:abstinence, Mirena inserted 2024 during dx lap    No cervical cancer screening on file- never    Hx abnormal pap or STD:denies    Hx of receiving HPV vaccination:???    Fam Hx of Breast, ovarian or uterine cancer:denies    Sexually Active:never            ROS:    Breast: Denies pain, lump or nipple discharge    GYN: Denies pelvic pain, discharge, itching, odor or dysuria.     Constitutional: Negative for chills and fever.     HENT: Negative for severe headaches or vision changes    Respiratory: Negative for cough and shortness of breath.      Cardiovascular: Negative for chest pain and palpitations.     Gastrointestinal: Negative for nausea and vomiting. Negative for diarrhea and constipation    Genitourinary: Negative for dysuria and hematuria.         Past Medical History:   Diagnosis Date    Acid reflux     ADHD (attention deficit hyperactivity disorder)     Anemia     Depression with anxiety     Dysmenorrhea     Endometriosis     by lap    Eosinophilic esophagitis     GERD (gastroesophageal reflux disease)     Gilbert syndrome     Heart murmur     no ECHO found in chart, denies dizziness, SOB, CP- can climb 2 flights of stairs without shortness of breath, \"Very faint systolic murmur noted\" found documented 22 in [facility]    Menometrorrhagia     Panic attacks     Slow to wake up after anesthesia        Past Surgical History:   Procedure Laterality Date    ESOPHAGOGASTRODUODENOSCOPY      multiple    LAPAROSCOPY  2023    Diagnostic surgery for Endometriosis    PELVIC LAPAROSCOPY  2023    UPPER GASTROINTESTINAL

## 2024-10-18 NOTE — PROGRESS NOTES
Chaperone for Intimate Exam     Chaperone was offer accepted as part of the rooming process    Chaperone: Cheryl Whitmore

## 2024-10-25 LAB
COLLECTION METHOD: NORMAL
CYTOLOGIST CVX/VAG CYTO: NORMAL
CYTOLOGY CVX/VAG DOC THIN PREP: NORMAL
DATE OF LMP: 0
HPV APTIMA: NEGATIVE
HPV GENOTYPE REFLEX: NORMAL
Lab: NORMAL
PAP SOURCE: NORMAL
PATH REPORT.FINAL DX SPEC: NORMAL
PATHOLOGIST CVX/VAG CYTO: NORMAL
STAT OF ADQ CVX/VAG CYTO-IMP: NORMAL

## 2024-11-12 DIAGNOSIS — R19.7 DIARRHEA, UNSPECIFIED TYPE: ICD-10-CM

## 2024-11-13 RX ORDER — COLESEVELAM 180 1/1
625 TABLET ORAL 2 TIMES DAILY WITH MEALS
Qty: 180 TABLET | Refills: 0 | Status: SHIPPED | OUTPATIENT
Start: 2024-11-13

## 2024-11-17 PROBLEM — Z01.419 WOMEN'S ANNUAL ROUTINE GYNECOLOGICAL EXAMINATION: Status: RESOLVED | Noted: 2024-10-18 | Resolved: 2024-11-17

## 2024-12-17 DIAGNOSIS — E53.8 LOW SERUM VITAMIN B12: ICD-10-CM

## 2024-12-19 ENCOUNTER — CLINICAL DOCUMENTATION (OUTPATIENT)
Dept: PHYSICAL THERAPY | Age: 19
End: 2024-12-19

## 2024-12-19 NOTE — THERAPY DISCHARGE
[facility] @ [address]  [place] [address]  Phone: [phone]  Fax: [phone]    OUTPATIENT PHYSICAL THERAPY  Discharge Summary 12/19/2024  Episode  Appt Desk         Anjana Mercado has been seen in physical therapy for 9  visits from 6/27/24 to 9/13/24, with 0 cancellations and 0 no shows. Ms. Mercado's therapy has come to an end at this time due to: Patient did not return for/schedule additional treatment  Expiration of plan of care without further referral by ordering physician    Physical Therapy Goals:  Unable to assess at time of discharge.    Tammy Verma, PT

## 2025-01-09 RX ORDER — MULTIVITAMIN WITH IRON
1000 TABLET ORAL DAILY
Qty: 60 TABLET | Refills: 6 | OUTPATIENT
Start: 2025-01-09 | End: 2026-01-09

## 2025-02-04 DIAGNOSIS — K20.0 EOSINOPHILIC ESOPHAGITIS: ICD-10-CM

## 2025-02-04 DIAGNOSIS — K21.00 GASTROESOPHAGEAL REFLUX DISEASE WITH ESOPHAGITIS, UNSPECIFIED WHETHER HEMORRHAGE: Primary | ICD-10-CM

## 2025-02-04 RX ORDER — OMEPRAZOLE 40 MG/1
40 CAPSULE, DELAYED RELEASE ORAL
Qty: 90 CAPSULE | Refills: 1 | Status: SHIPPED | OUTPATIENT
Start: 2025-02-04 | End: 2025-08-03

## 2025-02-17 DIAGNOSIS — G43.109 MIGRAINE WITH AURA AND WITHOUT STATUS MIGRAINOSUS, NOT INTRACTABLE: Primary | ICD-10-CM

## 2025-02-21 ENCOUNTER — OFFICE VISIT (OUTPATIENT)
Dept: NEUROLOGY | Age: 20
End: 2025-02-21

## 2025-02-21 VITALS
HEART RATE: 78 BPM | DIASTOLIC BLOOD PRESSURE: 71 MMHG | BODY MASS INDEX: 20.11 KG/M2 | SYSTOLIC BLOOD PRESSURE: 109 MMHG | WEIGHT: 117.8 LBS | OXYGEN SATURATION: 98 % | HEIGHT: 64 IN

## 2025-02-21 DIAGNOSIS — G43.009 MIGRAINE WITHOUT AURA AND WITHOUT STATUS MIGRAINOSUS, NOT INTRACTABLE: Primary | ICD-10-CM

## 2025-02-21 PROBLEM — F41.0 PANIC DISORDER: Status: ACTIVE | Noted: 2018-08-31

## 2025-02-21 PROBLEM — J30.9 ALLERGIC RHINITIS: Status: ACTIVE | Noted: 2025-02-21

## 2025-02-21 PROBLEM — F90.2 ADHD (ATTENTION DEFICIT HYPERACTIVITY DISORDER), COMBINED TYPE: Status: ACTIVE | Noted: 2018-08-31

## 2025-02-21 PROBLEM — Z96.1 HISTORY OF ARTIFICIAL EYE LENS: Status: ACTIVE | Noted: 2023-10-27

## 2025-02-21 PROBLEM — E28.2 PCOS (POLYCYSTIC OVARIAN SYNDROME): Status: ACTIVE | Noted: 2022-10-21

## 2025-02-21 PROBLEM — K21.00 GASTRO-ESOPHAGEAL REFLUX DISEASE WITH ESOPHAGITIS: Status: ACTIVE | Noted: 2019-12-11

## 2025-02-21 RX ORDER — LISDEXAMFETAMINE DIMESYLATE 30 MG/1
30 CAPSULE ORAL EVERY MORNING
COMMUNITY
Start: 2025-01-29

## 2025-02-21 ASSESSMENT — PATIENT HEALTH QUESTIONNAIRE - PHQ9
SUM OF ALL RESPONSES TO PHQ QUESTIONS 1-9: 0
SUM OF ALL RESPONSES TO PHQ9 QUESTIONS 1 & 2: 0
SUM OF ALL RESPONSES TO PHQ QUESTIONS 1-9: 0
SUM OF ALL RESPONSES TO PHQ QUESTIONS 1-9: 0
2. FEELING DOWN, DEPRESSED OR HOPELESS: NOT AT ALL
1. LITTLE INTEREST OR PLEASURE IN DOING THINGS: NOT AT ALL
SUM OF ALL RESPONSES TO PHQ QUESTIONS 1-9: 0

## 2025-02-21 ASSESSMENT — ENCOUNTER SYMPTOMS
NAUSEA: 1
PHOTOPHOBIA: 1
RESPIRATORY NEGATIVE: 1
ALLERGIC/IMMUNOLOGIC NEGATIVE: 1
EYE PAIN: 0
VOMITING: 1

## 2025-02-21 NOTE — PROGRESS NOTES
Patient: Anjana Mercado  MRN: [id number]  : 2005    CC: Headaches    HPI:  Anjana Mercado is a 19 y.o.yo female here for new patient evaluation for headaches.  Referred by   Klaus Pacheco MD    She is here today by herself. RHF, denies headache today. Headaches are generally located right sided occipital region and radiate anteriorly and retro-orbitally, can also be on left.  They began started in adolescents and progressively  increased  in frequency .  The current frequency of headaches: ~8 /mth.  Duration: 4 hours -1 day.  Severity is 7-8/10. Quality of headaches are described as sharp, shooting pain that transitions to throbbing, gradual onset.  Associated symptoms: photophobia, phonophobia, nausea/vomiting, hyperosmia,  loss of coordination of hands, sensory changes, speech difficulty. Denies thunderclap, rhinorrhea, excessive tearing, worsening with positional changes.   The headaches are exacerbated by stress, menstrua, excessive caffeine (red bull).   Factors that relieve the headaches are noise canceling headphones, quiet/dark room, close her eyes.  Relaxation techniques.     Denies tobacco use, drinks 1 shot of ashley every 1-2 weeks on occasion, denies recreational drug use.     Denies snoring, daytime somnolence. No hx sleep apnea or prior testing. She endorses rare occasions of waking with headaches in the morning.     Endorses little caffeine intake. Drinks ~100 oz water daily.     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 Currently has Mirena IUD in place.      Family Members with headache history: mother and sister    Current Medications used for HA treatment: Excedrin migraine and OTC tylenol    Medications tried in the past: reglan, promethazine- currently on NDRI    Associated medical problems: ADHD, allergic rhinitis, GERD, depression/anxiety, B12 deficiency, anemia, EOE, concussion as child    Previous Imaging: prior CT in [place] at age of 5, unable to view images.

## 2025-02-21 NOTE — PATIENT INSTRUCTIONS
Headache Education:   Instructed the patient on over-the-counter headache management medications including: CoQ10, magnesium oxide, and butterbur.  To avoid a pain medication overuse headache trying not to take pain medicines more than 3 doses a week.   Avoid use of Fioricet or opioids to treat headaches as this can increase risk for rebound headaches.   To help relieve headache symptoms without taking pain medicine lie down under darkroom and drink glass of water.  Consider lifestyle modification including good sleep hygiene, routine medial schedules, regular exercise and managing triggers.  Keep a headache diary  to reveal triggers and possible patterns.  Triggers may be: Food, stress, perfumes, alcohol, or even chocolate.  Drink plenty of water and try to get 8 hours of sleep each night to reduce risk factors that may cause headaches.      Samples of Nurtec ODT 75 mg and Ubrelvy 100 mg provided to patient. Advised not to take medication on same day and to update office on efficacy.   Instructions:  Qulipta 60   Nurtec ODT 75 mg daily as needed for migraine abortive therapy. Not to exceed 75mg/24 hours.   Ubrelvy 100 mg daily as needed for migraine abortive therapy. May repeat for 1 dose in 2 hours if needed. Not to exceed 200mg/24 hours.

## 2025-02-24 PROBLEM — G43.009 MIGRAINE WITHOUT AURA AND WITHOUT STATUS MIGRAINOSUS, NOT INTRACTABLE: Status: ACTIVE | Noted: 2025-02-24

## 2025-03-04 RX ORDER — RIMEGEPANT SULFATE 75 MG/75MG
TABLET, ORALLY DISINTEGRATING ORAL
Qty: 16 TABLET | Refills: 11 | Status: SHIPPED | OUTPATIENT
Start: 2025-03-04

## 2025-04-18 ENCOUNTER — OFFICE VISIT (OUTPATIENT)
Dept: NEUROLOGY | Age: 20
End: 2025-04-18
Payer: COMMERCIAL

## 2025-04-18 VITALS
HEIGHT: 64 IN | SYSTOLIC BLOOD PRESSURE: 113 MMHG | WEIGHT: 117 LBS | HEART RATE: 72 BPM | OXYGEN SATURATION: 100 % | DIASTOLIC BLOOD PRESSURE: 74 MMHG | BODY MASS INDEX: 19.97 KG/M2

## 2025-04-18 DIAGNOSIS — G43.009 MIGRAINE WITHOUT AURA AND WITHOUT STATUS MIGRAINOSUS, NOT INTRACTABLE: Primary | ICD-10-CM

## 2025-04-18 PROCEDURE — 99214 OFFICE O/P EST MOD 30 MIN: CPT | Performed by: NURSE PRACTITIONER

## 2025-04-18 RX ORDER — PROMETHAZINE HYDROCHLORIDE 25 MG/1
TABLET ORAL
COMMUNITY
Start: 2025-02-06

## 2025-04-18 RX ORDER — METOCLOPRAMIDE 10 MG/1
TABLET ORAL
COMMUNITY
Start: 2025-02-06

## 2025-04-18 RX ORDER — RIMEGEPANT SULFATE 75 MG/75MG
75 TABLET, ORALLY DISINTEGRATING ORAL EVERY OTHER DAY
Qty: 16 TABLET | Refills: 11 | Status: SHIPPED | OUTPATIENT
Start: 2025-04-18

## 2025-04-18 ASSESSMENT — ENCOUNTER SYMPTOMS
VOMITING: 1
NAUSEA: 1
ALLERGIC/IMMUNOLOGIC NEGATIVE: 1
PHOTOPHOBIA: 1
EYE PAIN: 0
RESPIRATORY NEGATIVE: 1

## 2025-04-18 NOTE — PROGRESS NOTES
Patient: Anjana Mercado  MRN: [id number]  : 2005    CC: Headaches    HPI:  Anjana Mercado is a 20 y.o.yo female here for new patient evaluation for headaches.  Referred by    ref. provider found    She is here today by herself. RHF, denies headache today. Headaches are generally located right sided occipital region and radiate anteriorly and retro-orbitally, can also be on left.  They began started in adolescents and progressively  increased  in frequency .  The current frequency of headaches: ~8 /mth.  Duration: 4 hours -1 day.  Severity is 7-8/10. Quality of headaches are described as sharp, shooting pain that transitions to throbbing, gradual onset.  Associated symptoms: photophobia, phonophobia, nausea/vomiting, hyperosmia,  loss of coordination of hands, sensory changes, speech difficulty. Denies thunderclap, rhinorrhea, excessive tearing, worsening with positional changes.   The headaches are exacerbated by stress, menstrua, excessive caffeine (red bull).   Factors that relieve the headaches are noise canceling headphones, quiet/dark room, close her eyes.  Relaxation techniques.     Denies tobacco use, drinks 1 shot of ashley every 1-2 weeks on occasion, denies recreational drug use.     Denies snoring, daytime somnolence. No hx sleep apnea or prior testing. She endorses rare occasions of waking with headaches in the morning.     Endorses little caffeine intake. Drinks ~100 oz water daily.     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 Currently has Mirena IUD in place.      Family Members with headache history: mother and sister    Current Medications used for HA treatment: Excedrin migraine and OTC tylenol    Medications tried in the past: reglan, promethazine- [facility]    Associated medical problems: ADHD, allergic rhinitis, GERD, depression/anxiety, B12 deficiency, anemia, EOE, concussion as child    Previous Imaging: prior CT in [place] at age of 5, unable to view images.  Spoke with Bianca's grandmother, and told her the script was sent to the [facility] on [address] as requested. She is aware that we will not be doing a prior auth as the pharmacy automatically run the script with the patient's insurance but as we know [employer] wont cover it so she will need to tell the pharmacy that she will pay the cash price without insurance, she verbalized understanding and is appreciative of the help getting this figured out. She was told to call back with any questions or concerns.

## 2025-04-18 NOTE — PATIENT INSTRUCTIONS
Headache Education:   Instructed the patient on over-the-counter headache management medications including: CoQ10, magnesium oxide, riboflavin, fever few and butterbur.  To avoid a pain medication overuse headache trying not to take pain medicines more than 3 doses a week.   Avoid use of Fioricet or opioids to treat headaches as this can increase risk for rebound headaches.   To help relieve headache symptoms without taking pain medicine lie down under darkroom and drink glass of water.  Consider lifestyle modification including good sleep hygiene, routine medial schedules, regular exercise and managing triggers.  Keep a headache diary  to reveal triggers and possible patterns.  Triggers may be: Food, stress, perfumes, alcohol, or even chocolate.  Drink plenty of water and try to get 8 hours of sleep each night to reduce risk factors that may cause headaches.

## 2025-04-28 DIAGNOSIS — R19.7 DIARRHEA, UNSPECIFIED TYPE: ICD-10-CM

## 2025-04-28 RX ORDER — COLESEVELAM 180 1/1
625 TABLET ORAL 2 TIMES DAILY WITH MEALS
Qty: 180 TABLET | Refills: 0 | Status: SHIPPED | OUTPATIENT
Start: 2025-04-28

## 2025-04-28 NOTE — TELEPHONE ENCOUNTER
Patient called requesting a refill for:     Name of Medication(s) :      colesevelam (WELCHOL) 625 MG tablet      Preferred Pharmacy:   [facility] [id number] - [place]IREZ - [address]  - P [phone] - F [phone]  [address] [address]  Phone: [phone]  Fax: [phone]        LOV:4/30/2024    NOV: none         Please advise

## 2025-05-15 ENCOUNTER — OFFICE VISIT (OUTPATIENT)
Dept: OBGYN CLINIC | Age: 20
End: 2025-05-15
Payer: COMMERCIAL

## 2025-05-15 VITALS
SYSTOLIC BLOOD PRESSURE: 104 MMHG | HEIGHT: 64 IN | WEIGHT: 122 LBS | DIASTOLIC BLOOD PRESSURE: 60 MMHG | BODY MASS INDEX: 20.83 KG/M2

## 2025-05-15 DIAGNOSIS — N80.9 ENDOMETRIOSIS: ICD-10-CM

## 2025-05-15 DIAGNOSIS — R10.2 PELVIC PAIN: ICD-10-CM

## 2025-05-15 PROCEDURE — 99213 OFFICE O/P EST LOW 20 MIN: CPT | Performed by: NURSE PRACTITIONER

## 2025-05-15 RX ORDER — ELAGOLIX 150 MG/1
150 TABLET, FILM COATED ORAL DAILY
Qty: 30 TABLET | Refills: 12 | Status: SHIPPED | OUTPATIENT
Start: 2025-05-15

## 2025-05-15 NOTE — PROGRESS NOTES
Anjana Mercado is a 20 y.o.        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 Hx endometriosis - proven with surgery   No LMP recorded. (Menstrual status: IUD).   Menses:currently no menses with Mirena IUD/Orilissa started 2024   Menstrual Complaints: some spotting when she missed orilissa for a few days, one day of pelvic pain after donating blood   Birth Control:abstinence, Mirena inserted 2024 during dx lap   No cervical cancer screening on file- never   Hx abnormal pap or STD:denies   Hx of receiving HPV vaccination:???   Fam Hx of Breast, ovarian or uterine cancer:denies   Sexually Active:never      5/15/25: here for endometriosis followup. Orilissa started 2024. Mirena inserted 2024 during dx lap       No LMP recorded. (Menstrual status: IUD).  Date of last Cervical Cancer screen (HPV or PAP): 10/18/2024        Past Medical History:   Diagnosis Date    Acid reflux     ADHD (attention deficit hyperactivity disorder)     Anemia     Depression with anxiety     Dysmenorrhea     Endometriosis     by lap    Eosinophilic esophagitis     GERD (gastroesophageal reflux disease)     Gilbert syndrome     Headache N/A    Severe migraines have plagued me all my life, and its common in the women in my family. Doctors have dismissed my requests for further testing.    Heart murmur     no ECHO found in chart, denies dizziness, SOB, CP- can climb 2 flights of stairs without shortness of breath, \"Very faint systolic murmur noted\" found documented 22 in [place]    Menometrorrhagia     Migraine     Panic attacks     Slow to wake up after anesthesia        Past Surgical History:   Procedure Laterality Date    ESOPHAGOGASTRODUODENOSCOPY      multiple    LAPAROSCOPY  2023    Diagnostic surgery for Endometriosis    PELVIC LAPAROSCOPY  2023    UPPER GASTROINTESTINAL ENDOSCOPY N/A 2024    ESOPHAGOGASTRODUODENOSCOPY BIOPSY performed by Osmany Galdamez MD at [facility] ENDOSCOPY

## 2025-05-15 NOTE — PROGRESS NOTES
Patient comes in today for f/u visit. Pt states no complaint or concerns today. Pt would like to discuss transferring care to Dr. Benz.     LAST PAP:  10/18/2024 neg, HPV neg     LAST MAMMO:  never     LMP:  No LMP recorded. (Menstrual status: IUD).    BIRTH CONTROL:  IUD    TOBACCO USE:  No    FAMILY HISTORY OF:   Breast Cancer:  No   Ovarian Cancer:  Yes   Uterine Cancer:  No   Colon Cancer:  No    Vitals:    05/15/25 1019   BP: 104/60   BP Site: Right Upper Arm   Patient Position: Sitting   Weight: 55.3 kg (122 lb)   Height: 1.626 m (5' 4\")        Dalia Swanson MA  05/15/25  10:26 AM

## 2025-05-15 NOTE — ASSESSMENT & PLAN NOTE
Symptoms stable with Orilissa (started June 2024) and Mirena IUD inserted 4/14/2024 during dx lap   Refill sent for orilissa  Referral sent to [facility] as pt desires female provider for AE (due 10/2025)

## 2025-05-15 NOTE — PROGRESS NOTES
I have reviewed the patient's visit today including history, exam and assessment by ASIF Santana.  I agree with treatment/plan as above.    Driss Paige MD  11:09 AM  05/15/25

## (undated) DEVICE — AIRLIFE™ OXYGEN TUBING 7 FEET (2.1 M) CRUSH RESISTANT OXYGEN TUBING, VINYL TIPPED: Brand: AIRLIFE™

## (undated) DEVICE — ENDOSCOPIC KIT 1.1+ OP4 CA DE 2 GWN AAMI LEVEL 3

## (undated) DEVICE — CANNULA NSL ORAL AD FOR CAPNOFLEX CO2 O2 AIRLFE

## (undated) DEVICE — SINGLE PORT MANIFOLD: Brand: NEPTUNE 2

## (undated) DEVICE — KENDALL RADIOLUCENT FOAM MONITORING ELECTRODE RECTANGULAR SHAPE: Brand: KENDALL

## (undated) DEVICE — FORCEPS BX L240CM JAW DIA2.8MM L CAP W/ NDL MIC MESH TOOTH

## (undated) DEVICE — BLOCK BITE AD 60FR W/ VELC STRP ADDRESSES MOST PT AND

## (undated) DEVICE — MOUTHPIECE ENDOSCP L CTRL OPN AND SIDE PORTS DISP

## (undated) DEVICE — CONNECTOR TBNG OD5-7MM O2 END DISP

## (undated) DEVICE — GAUZE,SPONGE,4"X4",12PLY,WOVEN,NS,LF: Brand: MEDLINE

## (undated) DEVICE — CONTAINER FORMALIN PREFILLED 10% NBF 60ML